# Patient Record
Sex: FEMALE | Race: WHITE | NOT HISPANIC OR LATINO | Employment: FULL TIME | URBAN - METROPOLITAN AREA
[De-identification: names, ages, dates, MRNs, and addresses within clinical notes are randomized per-mention and may not be internally consistent; named-entity substitution may affect disease eponyms.]

---

## 2018-10-07 ENCOUNTER — HOSPITAL ENCOUNTER (EMERGENCY)
Facility: HOSPITAL | Age: 38
Discharge: HOME | End: 2018-10-07
Attending: FAMILY MEDICINE | Admitting: FAMILY MEDICINE

## 2018-10-07 ENCOUNTER — APPOINTMENT (OUTPATIENT)
Dept: GENERAL RADIOLOGY | Facility: HOSPITAL | Age: 38
End: 2018-10-07

## 2018-10-07 VITALS
HEIGHT: 64 IN | BODY MASS INDEX: 19.63 KG/M2 | RESPIRATION RATE: 18 BRPM | WEIGHT: 115 LBS | HEART RATE: 77 BPM | TEMPERATURE: 98.2 F | DIASTOLIC BLOOD PRESSURE: 60 MMHG | OXYGEN SATURATION: 98 % | SYSTOLIC BLOOD PRESSURE: 96 MMHG

## 2018-10-07 DIAGNOSIS — R50.9 FEVER AND CHILLS: Primary | ICD-10-CM

## 2018-10-07 LAB
ALBUMIN SERPL-MCNC: 4.2 G/DL (ref 3.5–5.2)
ALBUMIN/GLOB SERPL: 1.6 G/DL
ALP SERPL-CCNC: 49 U/L (ref 39–117)
ALT SERPL W P-5'-P-CCNC: 7 U/L (ref 1–33)
ANION GAP SERPL CALCULATED.3IONS-SCNC: 13.2 MMOL/L
AST SERPL-CCNC: 11 U/L (ref 1–32)
B PERT DNA SPEC QL NAA+PROBE: NOT DETECTED
BASOPHILS # BLD AUTO: 0.04 10*3/MM3 (ref 0–0.2)
BASOPHILS NFR BLD AUTO: 0.3 % (ref 0–1.5)
BILIRUB SERPL-MCNC: 0.5 MG/DL (ref 0.1–1.2)
BUN BLD-MCNC: 9 MG/DL (ref 6–20)
BUN/CREAT SERPL: 12.2 (ref 7–25)
C PNEUM DNA NPH QL NAA+NON-PROBE: NOT DETECTED
CALCIUM SPEC-SCNC: 8.9 MG/DL (ref 8.6–10.5)
CHLORIDE SERPL-SCNC: 104 MMOL/L (ref 98–107)
CO2 SERPL-SCNC: 21.8 MMOL/L (ref 22–29)
CREAT BLD-MCNC: 0.74 MG/DL (ref 0.57–1)
D-LACTATE SERPL-SCNC: 0.8 MMOL/L (ref 0.5–2)
DEPRECATED RDW RBC AUTO: 45.9 FL (ref 37–54)
EOSINOPHIL # BLD AUTO: 0.12 10*3/MM3 (ref 0–0.7)
EOSINOPHIL NFR BLD AUTO: 0.9 % (ref 0.3–6.2)
ERYTHROCYTE [DISTWIDTH] IN BLOOD BY AUTOMATED COUNT: 13.1 % (ref 11.7–13)
FLUAV H1 2009 PAND RNA NPH QL NAA+PROBE: NOT DETECTED
FLUAV H1 HA GENE NPH QL NAA+PROBE: NOT DETECTED
FLUAV H3 RNA NPH QL NAA+PROBE: NOT DETECTED
FLUAV SUBTYP SPEC NAA+PROBE: NOT DETECTED
FLUBV RNA ISLT QL NAA+PROBE: NOT DETECTED
GFR SERPL CREATININE-BSD FRML MDRD: 88 ML/MIN/1.73
GLOBULIN UR ELPH-MCNC: 2.6 GM/DL
GLUCOSE BLD-MCNC: 131 MG/DL (ref 65–99)
HADV DNA SPEC NAA+PROBE: NOT DETECTED
HCOV 229E RNA SPEC QL NAA+PROBE: NOT DETECTED
HCOV HKU1 RNA SPEC QL NAA+PROBE: NOT DETECTED
HCOV NL63 RNA SPEC QL NAA+PROBE: NOT DETECTED
HCOV OC43 RNA SPEC QL NAA+PROBE: NOT DETECTED
HCT VFR BLD AUTO: 37.7 % (ref 35.6–45.5)
HETEROPH AB SER QL LA: NEGATIVE
HGB BLD-MCNC: 12.4 G/DL (ref 11.9–15.5)
HMPV RNA NPH QL NAA+NON-PROBE: NOT DETECTED
HPIV1 RNA SPEC QL NAA+PROBE: NOT DETECTED
HPIV2 RNA SPEC QL NAA+PROBE: NOT DETECTED
HPIV3 RNA NPH QL NAA+PROBE: NOT DETECTED
HPIV4 P GENE NPH QL NAA+PROBE: NOT DETECTED
IMM GRANULOCYTES # BLD: 0.03 10*3/MM3 (ref 0–0.03)
IMM GRANULOCYTES NFR BLD: 0.2 % (ref 0–0.5)
LYMPHOCYTES # BLD AUTO: 1.52 10*3/MM3 (ref 0.9–4.8)
LYMPHOCYTES NFR BLD AUTO: 10.8 % (ref 19.6–45.3)
M PNEUMO IGG SER IA-ACNC: NOT DETECTED
MCH RBC QN AUTO: 31.9 PG (ref 26.9–32)
MCHC RBC AUTO-ENTMCNC: 32.9 G/DL (ref 32.4–36.3)
MCV RBC AUTO: 96.9 FL (ref 80.5–98.2)
MONOCYTES # BLD AUTO: 0.8 10*3/MM3 (ref 0.2–1.2)
MONOCYTES NFR BLD AUTO: 5.7 % (ref 5–12)
NEUTROPHILS # BLD AUTO: 11.5 10*3/MM3 (ref 1.9–8.1)
NEUTROPHILS NFR BLD AUTO: 82.1 % (ref 42.7–76)
PLATELET # BLD AUTO: 187 10*3/MM3 (ref 140–500)
PMV BLD AUTO: 11.1 FL (ref 6–12)
POTASSIUM BLD-SCNC: 3.4 MMOL/L (ref 3.5–5.2)
PROT SERPL-MCNC: 6.8 G/DL (ref 6–8.5)
RBC # BLD AUTO: 3.89 10*6/MM3 (ref 3.9–5.2)
RHINOVIRUS RNA SPEC NAA+PROBE: NOT DETECTED
RSV RNA NPH QL NAA+NON-PROBE: NOT DETECTED
S PYO AG THROAT QL: NEGATIVE
SODIUM BLD-SCNC: 139 MMOL/L (ref 136–145)
WBC NRBC COR # BLD: 14.01 10*3/MM3 (ref 4.5–10.7)

## 2018-10-07 PROCEDURE — 71046 X-RAY EXAM CHEST 2 VIEWS: CPT

## 2018-10-07 PROCEDURE — 87633 RESP VIRUS 12-25 TARGETS: CPT | Performed by: FAMILY MEDICINE

## 2018-10-07 PROCEDURE — 87581 M.PNEUMON DNA AMP PROBE: CPT | Performed by: FAMILY MEDICINE

## 2018-10-07 PROCEDURE — 87081 CULTURE SCREEN ONLY: CPT | Performed by: FAMILY MEDICINE

## 2018-10-07 PROCEDURE — 86308 HETEROPHILE ANTIBODY SCREEN: CPT | Performed by: FAMILY MEDICINE

## 2018-10-07 PROCEDURE — 87040 BLOOD CULTURE FOR BACTERIA: CPT | Performed by: FAMILY MEDICINE

## 2018-10-07 PROCEDURE — 85025 COMPLETE CBC W/AUTO DIFF WBC: CPT | Performed by: FAMILY MEDICINE

## 2018-10-07 PROCEDURE — 87798 DETECT AGENT NOS DNA AMP: CPT | Performed by: FAMILY MEDICINE

## 2018-10-07 PROCEDURE — 87880 STREP A ASSAY W/OPTIC: CPT | Performed by: FAMILY MEDICINE

## 2018-10-07 PROCEDURE — 83605 ASSAY OF LACTIC ACID: CPT | Performed by: FAMILY MEDICINE

## 2018-10-07 PROCEDURE — 80053 COMPREHEN METABOLIC PANEL: CPT | Performed by: FAMILY MEDICINE

## 2018-10-07 PROCEDURE — 99284 EMERGENCY DEPT VISIT MOD MDM: CPT

## 2018-10-07 PROCEDURE — 87486 CHLMYD PNEUM DNA AMP PROBE: CPT | Performed by: FAMILY MEDICINE

## 2018-10-07 RX ORDER — IBUPROFEN 200 MG
600 TABLET ORAL ONCE
Status: COMPLETED | OUTPATIENT
Start: 2018-10-07 | End: 2018-10-07

## 2018-10-07 RX ORDER — HYDROCODONE BITARTRATE AND ACETAMINOPHEN 5; 325 MG/1; MG/1
1 TABLET ORAL EVERY 4 HOURS PRN
Qty: 6 TABLET | Refills: 0 | Status: SHIPPED | OUTPATIENT
Start: 2018-10-07 | End: 2021-03-16

## 2018-10-07 RX ADMIN — IBUPROFEN 600 MG: 200 TABLET, FILM COATED ORAL at 16:42

## 2018-10-07 NOTE — ED PROVIDER NOTES
EMERGENCY DEPARTMENT ENCOUNTER    CHIEF COMPLAINT  Chief Complaint: sore throat  History given by: Patient  History limited by: N/A  Room Number: 38/38  PMD: Provider, No Known      HPI:  Pt is a 38 y.o. female who presents complaining of sore throat which began yesterday. Pt also c/o fever which began last night, chills, bodyaches, HA, N/V x1 this morning, and productive cough, but denies diarrhea, dysuria, urinary frequency, or abd pain.  Pt reports she had congestion 2 weeks ago, but this had resolved and she felt her baseline self until yesterday. Pt reports her last dose of tylenol was 0600 this morning.    Duration:  1 day  Onset: gradual  Timing: constant  Location: throat  Radiation: none  Quality: sore  Intensity/Severity: moderate  Progression: unchanged  Associated Symptoms: fever which began last night, chills, bodyaches, HA, N/V x1 this morning, and productive cough  Aggravating Factors: none stated  Alleviating Factors: none stated  Previous Episodes: none  Treatment before arrival: Pt reports her last dose of tylenol was 0600 this morning.    PAST MEDICAL HISTORY  Active Ambulatory Problems     Diagnosis Date Noted   • No Active Ambulatory Problems     Resolved Ambulatory Problems     Diagnosis Date Noted   • No Resolved Ambulatory Problems     No Additional Past Medical History       PAST SURGICAL HISTORY  History reviewed. No pertinent surgical history.    FAMILY HISTORY  History reviewed. No pertinent family history.    SOCIAL HISTORY  Social History     Social History   • Marital status:      Spouse name: N/A   • Number of children: N/A   • Years of education: N/A     Occupational History   • Not on file.     Social History Main Topics   • Smoking status: Current Every Day Smoker     Packs/day: 0.50   • Smokeless tobacco: Not on file   • Alcohol use Yes      Comment: ocassional   • Drug use: No   • Sexual activity: Not on file     Other Topics Concern   • Not on file     Social History  Narrative   • No narrative on file       ALLERGIES  Oxycodone and Phenergan [promethazine hcl]    REVIEW OF SYSTEMS  Review of Systems   Constitutional: Positive for chills and fever.   HENT: Positive for sore throat. Negative for congestion.    Eyes: Negative.    Respiratory: Positive for cough (productive). Negative for shortness of breath.    Cardiovascular: Negative for chest pain.   Gastrointestinal: Positive for nausea and vomiting (once this morning). Negative for abdominal pain and diarrhea.   Genitourinary: Negative for dysuria and frequency.   Musculoskeletal: Positive for myalgias (generalized bodyaches). Negative for neck pain.   Skin: Negative for rash.   Allergic/Immunologic: Negative.    Neurological: Positive for headaches. Negative for weakness and numbness.   Hematological: Negative.    Psychiatric/Behavioral: Negative.    All other systems reviewed and are negative.      PHYSICAL EXAM  ED Triage Vitals   Temp Heart Rate Resp BP SpO2   10/07/18 1528 10/07/18 1528 10/07/18 1543 10/07/18 1544 10/07/18 1528   (!) 100.7 °F (38.2 °C) 93 18 99/62 97 %      Temp src Heart Rate Source Patient Position BP Location FiO2 (%)   10/07/18 1528 10/07/18 1528 10/07/18 1544 10/07/18 1544 --   Tympanic Monitor Sitting Right arm        Physical Exam   Constitutional: She is oriented to person, place, and time. No distress.   HENT:   Head: Normocephalic and atraumatic.   Mouth/Throat: Oropharynx is clear and moist.   Eyes: Pupils are equal, round, and reactive to light. EOM are normal.   Neck: Normal range of motion. Neck supple.   Cardiovascular: Normal rate, regular rhythm and normal heart sounds.    Pulmonary/Chest: Effort normal and breath sounds normal. No respiratory distress.   Abdominal: Soft. There is no tenderness. There is no rebound and no guarding.   Musculoskeletal: Normal range of motion. She exhibits no edema.   Neurological: She is alert and oriented to person, place, and time. She has normal  sensation and normal strength.   Skin: Skin is warm and dry. No rash noted.   Psychiatric: Mood and affect normal.   Nursing note and vitals reviewed.      LAB RESULTS  Lab Results (last 24 hours)     Procedure Component Value Units Date/Time    Respiratory Panel, PCR - Swab, Nasopharynx [[id number]]  (Normal) Collected:  10/07/18 1551    Specimen:  Swab from Nasopharynx Updated:  10/07/18 1710     ADENOVIRUS, PCR Not Detected     Coronavirus 229E Not Detected     Coronavirus HKU1 Not Detected     Coronavirus NL63 Not Detected     Coronavirus OC43 Not Detected     Human Metapneumovirus Not Detected     Human Rhinovirus/Enterovirus Not Detected     Influenza B PCR Not Detected     Parainfluenza Virus 1 Not Detected     Parainfluenza Virus 2 Not Detected     Parainfluenza Virus 3 Not Detected     Parainfluenza Virus 4 Not Detected     Bordetella pertussis pcr Not Detected     Influenza A H1 2009 PCR Not Detected     Chlamydophila pneumoniae PCR Not Detected     Mycoplasma pneumo by PCR Not Detected     Influenza A PCR Not Detected     Influenza A H3 Not Detected     Influenza A H1 Not Detected     RSV, PCR Not Detected    CBC & Differential [[id number]] Collected:  10/07/18 1757    Specimen:  Blood Updated:  10/07/18 1832    Narrative:       The following orders were created for panel order CBC & Differential.  Procedure                               Abnormality         Status                     ---------                               -----------         ------                     CBC Auto Differential[[id number]]        Abnormal            Final result                 Please view results for these tests on the individual orders.    Comprehensive Metabolic Panel [[id number]]  (Abnormal) Collected:  10/07/18 1757    Specimen:  Blood Updated:  10/07/18 1851     Glucose 131 (H) mg/dL      BUN 9 mg/dL      Creatinine 0.74 mg/dL      Sodium 139 mmol/L      Potassium 3.4 (L) mmol/L      Chloride 104 mmol/L      CO2 21.8 (L)  mmol/L      Calcium 8.9 mg/dL      Total Protein 6.8 g/dL      Albumin 4.20 g/dL      ALT (SGPT) 7 U/L      AST (SGOT) 11 U/L      Alkaline Phosphatase 49 U/L      Total Bilirubin 0.5 mg/dL      eGFR Non African Amer 88 mL/min/1.73      Globulin 2.6 gm/dL      A/G Ratio 1.6 g/dL      BUN/Creatinine Ratio 12.2     Anion Gap 13.2 mmol/L     CBC Auto Differential [[id number]]  (Abnormal) Collected:  10/07/18 1757    Specimen:  Blood Updated:  10/07/18 1832     WBC 14.01 (H) 10*3/mm3      RBC 3.89 (L) 10*6/mm3      Hemoglobin 12.4 g/dL      Hematocrit 37.7 %      MCV 96.9 fL      MCH 31.9 pg      MCHC 32.9 g/dL      RDW 13.1 (H) %      RDW-SD 45.9 fl      MPV 11.1 fL      Platelets 187 10*3/mm3      Neutrophil % 82.1 (H) %      Lymphocyte % 10.8 (L) %      Monocyte % 5.7 %      Eosinophil % 0.9 %      Basophil % 0.3 %      Immature Grans % 0.2 %      Neutrophils, Absolute 11.50 (H) 10*3/mm3      Lymphocytes, Absolute 1.52 10*3/mm3      Monocytes, Absolute 0.80 10*3/mm3      Eosinophils, Absolute 0.12 10*3/mm3      Basophils, Absolute 0.04 10*3/mm3      Immature Grans, Absolute 0.03 10*3/mm3     Lactic Acid, Plasma [[id number]]  (Normal) Collected:  10/07/18 1810    Specimen:  Blood Updated:  10/07/18 1846     Lactate 0.8 mmol/L     Blood Culture - Blood, Blood, Venous Line [[id number]] Collected:  10/07/18 1935    Specimen:  Blood from Arm, Right Updated:  10/07/18 2010    Blood Culture - Blood, Blood, Venous Line [[id number]] Collected:  10/07/18 2000    Specimen:  Blood from Arm, Left Updated:  10/07/18 2010    Mononucleosis Screen [[id number]] Collected:  10/07/18 2000    Specimen:  Blood Updated:  10/07/18 2012    Rapid Strep A Screen - Swab, Throat [[id number]]  (Normal) Collected:  10/07/18 2001    Specimen:  Swab from Throat Updated:  10/07/18 2024     Strep A Ag Negative    Beta Strep Culture, Throat - Swab, Throat [[id number]] Collected:  10/07/18 2001    Specimen:  Swab from Throat Updated:  10/07/18 2024          I  ordered the above labs and reviewed the results    RADIOLOGY  XR Chest 2 View   Final Result       CXR shows NAD.    I ordered the above noted radiological studies. Interpreted by radiologist. Reviewed by me in PACS.       PROCEDURES  Procedures      PROGRESS AND CONSULTS     1550  Ordered Respiratory Panel  PCR swab.    1629  Ordered ibuprofen for pt's fever.    1749  Respiratory Panel PCR negative. Ordered CXR, CMP, CBC for further evaluation.    1936  HR 88, /62, O2 sat 98% on room air  Rechecked pt, who is resting comfortably. Pt states she has been able to drink water in the ER, feels better after her fever broke with meds she received in the ER. Pt states her primary complaint is her sore throat.  Pt's throat exam was unremarkable, but will add on strep swab and mono screen for further evaluation.  I suspect her elevated WBC may be due to vomiting, will send off blood cultures for further evaluation and stressed importance for pt to return with new or worsening sx. Pt understands and agrees with the plan, all questions answered.    1941  Ordered rapid strep screen and mono screen and blood culture.    2034  Rechecked pt, who is resting comfortably.  Pt's strep swab resulted negative.  Plan for discharge, pt will be notified if her mono screen results positive. Discussed plan for discharge with norco as needed for pain, advised outpatient f/u to PCP and reasons to return to ER. Pt understands and agrees with the plan, all questions answered.      MEDICAL DECISION MAKING  Results were reviewed/discussed with the patient and they were also made aware of online access. Pt also made aware that some labs, such as cultures, will not be resulted during ER visit and follow up with PMD is necessary.     MDM  Number of Diagnoses or Management Options  Fever and chills:      Amount and/or Complexity of Data Reviewed  Clinical lab tests: ordered and reviewed (Respiratory Panel PCR negative.  CBC: WBC 14.01.  Lactic  Acid 0.8.  Strep Swab negative.)  Tests in the radiology section of CPT®: ordered and reviewed (CXR shows NAD.)  Independent visualization of images, tracings, or specimens: yes    Patient Progress  Patient progress: stable         DIAGNOSIS  Final diagnoses:   Fever and chills       DISPOSITION  DISCHARGE    Patient discharged in stable condition.    Reviewed implications of results, diagnosis, meds, responsibility to follow up, warning signs and symptoms of possible worsening, potential complications and reasons to return to ER.    Patient/Family voiced understanding of above instructions.    Discussed plan for discharge, as there is no emergent indication for admission. Patient referred to primary care provider for BP management due to today's BP. Pt/family is agreeable and understands need for follow up and repeat testing.  Pt is aware that discharge does not mean that nothing is wrong but it indicates no emergency is present that requires admission and they must continue care with follow-up as given below or physician of their choice.     FOLLOW-UP  PATIENT LIAISON [facility] [id number]  [phone]  Call   As needed.  If you don't have a PMD you can try to get one of ours.         Medication List      New Prescriptions    HYDROcodone-acetaminophen 5-325 MG per tablet  Commonly known as:  NORCO  Take 1 tablet by mouth Every 4 (Four) Hours As Needed (pain).              Latest Documented Vital Signs:  As of 8:40 PM  BP- 106/62 HR- 84 Temp- 98.2 °F (36.8 °C) (Tympanic) O2 sat- 95%    --  Documentation assistance provided by rosina Mane for Dr. Fried.  Information recorded by the rosina was done at my direction and has been verified and validated by me.                 Anabella Mane  10/07/18 2040       Mina Fried MD  10/07/18 0292

## 2018-10-07 NOTE — ED NOTES
Pt reports she has been taking dayquil and nyquil for her symptoms.      Bria Rowan, RN  10/07/18 8344

## 2018-10-08 NOTE — DISCHARGE INSTRUCTIONS
We will expect you to improve quickly over 24-48 hours.  If not or if you worsen or develop new symptoms, return or see your PMD promptly

## 2018-10-09 LAB — BACTERIA SPEC AEROBE CULT: NORMAL

## 2018-10-12 LAB
BACTERIA SPEC AEROBE CULT: NORMAL
BACTERIA SPEC AEROBE CULT: NORMAL

## 2021-03-16 ENCOUNTER — OFFICE VISIT (OUTPATIENT)
Dept: OBSTETRICS AND GYNECOLOGY | Facility: CLINIC | Age: 41
End: 2021-03-16

## 2021-03-16 VITALS
SYSTOLIC BLOOD PRESSURE: 109 MMHG | DIASTOLIC BLOOD PRESSURE: 72 MMHG | HEIGHT: 65 IN | WEIGHT: 129 LBS | BODY MASS INDEX: 21.49 KG/M2

## 2021-03-16 DIAGNOSIS — Z12.4 CERVICAL CANCER SCREENING: ICD-10-CM

## 2021-03-16 DIAGNOSIS — N63.10 BREAST MASS, RIGHT: ICD-10-CM

## 2021-03-16 DIAGNOSIS — Z01.419 WELL WOMAN EXAM: Primary | ICD-10-CM

## 2021-03-16 DIAGNOSIS — Z12.31 BREAST CANCER SCREENING BY MAMMOGRAM: ICD-10-CM

## 2021-03-16 PROCEDURE — 99386 PREV VISIT NEW AGE 40-64: CPT | Performed by: STUDENT IN AN ORGANIZED HEALTH CARE EDUCATION/TRAINING PROGRAM

## 2021-03-16 NOTE — PROGRESS NOTES
"GYN Annual Exam     CC- Here for annual exam and right breast lump..    Oliva Flores is a 41 y.o. female who presents for annual well woman exam and right breast lump. Periods are regular except most recently in  and Feb where she had 2 periods in 1 month. She reports that she had a period on 1/3/21, 21, early 2021 and late 2021.  Periods normally last 5 days. Dysmenorrhea:severe, occurring first 1-2 days of flow. Cyclic symptoms include none. No intermenstrual bleeding, spotting, or discharge.    She is concerned regarding a right breast lump that is about the size of a marble, non-tender and present for the last month. Denies nipple discharge or skin changes.     OB History        4    Para   4    Term                AB        Living           SAB        TAB        Ectopic        Molar        Multiple        Live Births                    Current contraception: vasectomy  History of abnormal Pap smear: yes - had a LEEP in  and had all normal pap smear following LEEP  Family history of uterine, colon or ovarian cancer: no  History of abnormal mammogram: n/a  Family history of breast cancer: no  Last Pap : - Normal per patient     Past Medical History:   Diagnosis Date   • Abnormal Pap smear of cervix        Past Surgical History:   Procedure Laterality Date   • CERVICAL BIOPSY  W/ LOOP ELECTRODE EXCISION         No current outpatient medications on file.    Allergies   Allergen Reactions   • Oxycodone Itching   • Phenergan [Promethazine Hcl] GI Intolerance       Social History     Tobacco Use   • Smoking status: Current Every Day Smoker     Packs/day: 0.50   Substance Use Topics   • Alcohol use: Yes     Comment: ocassional   • Drug use: No       History reviewed. No pertinent family history.    Review of Systems   Genitourinary: Positive for breast lump and menstrual problem.   All other systems reviewed and are negative.      /72   Ht 165.1 cm (65\")   Wt 58.5 kg " (129 lb)   LMP 02/27/2021   BMI 21.47 kg/m²     Physical Exam  Vitals reviewed. Exam conducted with a chaperone present.   Constitutional:       Appearance: She is normal weight.   HENT:      Head: Normocephalic and atraumatic.      Right Ear: External ear normal.      Left Ear: External ear normal.   Eyes:      Extraocular Movements: Extraocular movements intact.      Pupils: Pupils are equal, round, and reactive to light.   Cardiovascular:      Rate and Rhythm: Normal rate and regular rhythm.   Pulmonary:      Effort: Pulmonary effort is normal. No respiratory distress.   Chest:      Breasts: Breasts are symmetrical.         Right: Mass present. No swelling, bleeding, inverted nipple, nipple discharge, skin change or tenderness.         Left: Normal. No swelling, bleeding, inverted nipple, mass, nipple discharge, skin change or tenderness.          Comments: Right breast mass measuring approximately 3 cm with ovoid shape, mobile, non-tender. It is present at 11 o'clock just superior to the nipple.   Abdominal:      General: There is no distension.      Palpations: Abdomen is soft. There is no mass.      Tenderness: There is no abdominal tenderness. There is no guarding or rebound.      Hernia: No hernia is present.   Musculoskeletal:         General: No deformity. Normal range of motion.      Cervical back: Normal range of motion and neck supple.   Skin:     General: Skin is warm and dry.   Neurological:      General: No focal deficit present.      Mental Status: She is alert and oriented to person, place, and time.   Psychiatric:         Mood and Affect: Mood normal.         Behavior: Behavior normal.       Assessment     1) GYN annual well woman exam.   2) Right breast mass  3) Breast cancer screening  4) Cervical cancer screening      Plan     1) Breast Health - Clinical breast exam yearly, Self breast awareness monthly. Diagnostic bilateral mammogram with right breast ultrasound ordered to evaluate right  breast mass and perform breast cancer screening. We discussed that the next steps for her breast mass is diagnostic imaging followed by possible biopsy if recommended. It appears benign on exam but will base further follow up on imaging studies.    2) Pap - Pap smear with cotesting collected today. If returns normal cytology and negative HPV, recommend repeat screening in 5 years per ASCCP guidelines.   3) Smoking status- Current every day smoker. Encouraged cessation.   4) Activity recommends - Adult 150-300 min/week of multi-component physical activities that include balance training, aerobic and physical strengthening.    5) Follow up prn and one year.       Fina Suh MD

## 2021-03-17 PROBLEM — N63.10 BREAST MASS, RIGHT: Status: ACTIVE | Noted: 2021-03-17

## 2021-03-18 LAB
CYTOLOGIST CVX/VAG CYTO: NORMAL
CYTOLOGY CVX/VAG DOC CYTO: NORMAL
CYTOLOGY CVX/VAG DOC THIN PREP: NORMAL
DX ICD CODE: NORMAL
HIV 1 & 2 AB SER-IMP: NORMAL
HPV I/H RISK 4 DNA CVX QL PROBE+SIG AMP: NEGATIVE
OTHER STN SPEC: NORMAL
STAT OF ADQ CVX/VAG CYTO-IMP: NORMAL

## 2021-04-06 ENCOUNTER — BULK ORDERING (OUTPATIENT)
Dept: CASE MANAGEMENT | Facility: OTHER | Age: 41
End: 2021-04-06

## 2021-04-06 DIAGNOSIS — Z23 IMMUNIZATION DUE: ICD-10-CM

## 2021-04-12 ENCOUNTER — APPOINTMENT (OUTPATIENT)
Dept: WOMENS IMAGING | Facility: HOSPITAL | Age: 41
End: 2021-04-12

## 2021-04-12 PROCEDURE — 77062 BREAST TOMOSYNTHESIS BI: CPT | Performed by: RADIOLOGY

## 2021-04-12 PROCEDURE — 77066 DX MAMMO INCL CAD BI: CPT | Performed by: RADIOLOGY

## 2021-04-12 PROCEDURE — G0279 TOMOSYNTHESIS, MAMMO: HCPCS | Performed by: RADIOLOGY

## 2021-04-12 PROCEDURE — 76641 ULTRASOUND BREAST COMPLETE: CPT | Performed by: RADIOLOGY

## 2022-11-16 ENCOUNTER — TELEPHONE (OUTPATIENT)
Dept: OBSTETRICS AND GYNECOLOGY | Facility: CLINIC | Age: 42
End: 2022-11-16

## 2023-04-26 ENCOUNTER — APPOINTMENT (OUTPATIENT)
Dept: CT IMAGING | Facility: HOSPITAL | Age: 43
DRG: 27 | End: 2023-04-26

## 2023-04-26 ENCOUNTER — APPOINTMENT (OUTPATIENT)
Dept: MRI IMAGING | Facility: HOSPITAL | Age: 43
DRG: 27 | End: 2023-04-26

## 2023-04-26 ENCOUNTER — HOSPITAL ENCOUNTER (INPATIENT)
Facility: HOSPITAL | Age: 43
LOS: 2 days | Discharge: HOME | DRG: 27 | End: 2023-04-29
Attending: EMERGENCY MEDICINE | Admitting: INTERNAL MEDICINE

## 2023-04-26 DIAGNOSIS — H53.8 BLURRED VISION: ICD-10-CM

## 2023-04-26 DIAGNOSIS — H53.9 VISUAL DISTURBANCE: Primary | ICD-10-CM

## 2023-04-26 LAB
ALBUMIN SERPL-MCNC: 4.5 G/DL (ref 3.5–5.2)
ALBUMIN/GLOB SERPL: 2 G/DL
ALP SERPL-CCNC: 37 U/L (ref 39–117)
ALT SERPL W P-5'-P-CCNC: 7 U/L (ref 1–33)
ANION GAP SERPL CALCULATED.3IONS-SCNC: 9.1 MMOL/L (ref 5–15)
AST SERPL-CCNC: 12 U/L (ref 1–32)
B PARAPERT DNA SPEC QL NAA+PROBE: NOT DETECTED
B PERT DNA SPEC QL NAA+PROBE: NOT DETECTED
BASOPHILS # BLD AUTO: 0.08 10*3/MM3 (ref 0–0.2)
BASOPHILS NFR BLD AUTO: 1.1 % (ref 0–1.5)
BILIRUB SERPL-MCNC: <0.2 MG/DL (ref 0–1.2)
BILIRUB UR QL STRIP: NEGATIVE
BUN SERPL-MCNC: 14 MG/DL (ref 6–20)
BUN/CREAT SERPL: 17.3 (ref 7–25)
C PNEUM DNA NPH QL NAA+NON-PROBE: NOT DETECTED
CALCIUM SPEC-SCNC: 9.5 MG/DL (ref 8.6–10.5)
CHLORIDE SERPL-SCNC: 106 MMOL/L (ref 98–107)
CLARITY UR: CLEAR
CO2 SERPL-SCNC: 22.9 MMOL/L (ref 22–29)
COLOR UR: YELLOW
CREAT SERPL-MCNC: 0.81 MG/DL (ref 0.57–1)
DEPRECATED RDW RBC AUTO: 41.2 FL (ref 37–54)
EGFRCR SERPLBLD CKD-EPI 2021: 93.1 ML/MIN/1.73
EOSINOPHIL # BLD AUTO: 0.16 10*3/MM3 (ref 0–0.4)
EOSINOPHIL NFR BLD AUTO: 2.3 % (ref 0.3–6.2)
ERYTHROCYTE [DISTWIDTH] IN BLOOD BY AUTOMATED COUNT: 12.1 % (ref 12.3–15.4)
ERYTHROCYTE [SEDIMENTATION RATE] IN BLOOD: 7 MM/HR (ref 0–20)
FLUAV SUBTYP SPEC NAA+PROBE: NOT DETECTED
FLUBV RNA ISLT QL NAA+PROBE: NOT DETECTED
GLOBULIN UR ELPH-MCNC: 2.2 GM/DL
GLUCOSE SERPL-MCNC: 103 MG/DL (ref 65–99)
GLUCOSE UR STRIP-MCNC: NEGATIVE MG/DL
HADV DNA SPEC NAA+PROBE: NOT DETECTED
HCG SERPL QL: NEGATIVE
HCOV 229E RNA SPEC QL NAA+PROBE: NOT DETECTED
HCOV HKU1 RNA SPEC QL NAA+PROBE: NOT DETECTED
HCOV NL63 RNA SPEC QL NAA+PROBE: NOT DETECTED
HCOV OC43 RNA SPEC QL NAA+PROBE: NOT DETECTED
HCT VFR BLD AUTO: 36.7 % (ref 34–46.6)
HGB BLD-MCNC: 12.4 G/DL (ref 12–15.9)
HGB UR QL STRIP.AUTO: NEGATIVE
HMPV RNA NPH QL NAA+NON-PROBE: NOT DETECTED
HPIV1 RNA ISLT QL NAA+PROBE: NOT DETECTED
HPIV2 RNA SPEC QL NAA+PROBE: NOT DETECTED
HPIV3 RNA NPH QL NAA+PROBE: NOT DETECTED
HPIV4 P GENE NPH QL NAA+PROBE: NOT DETECTED
IMM GRANULOCYTES # BLD AUTO: 0.01 10*3/MM3 (ref 0–0.05)
IMM GRANULOCYTES NFR BLD AUTO: 0.1 % (ref 0–0.5)
KETONES UR QL STRIP: NEGATIVE
LEUKOCYTE ESTERASE UR QL STRIP.AUTO: NEGATIVE
LYMPHOCYTES # BLD AUTO: 2.73 10*3/MM3 (ref 0.7–3.1)
LYMPHOCYTES NFR BLD AUTO: 38.9 % (ref 19.6–45.3)
M PNEUMO IGG SER IA-ACNC: NOT DETECTED
MCH RBC QN AUTO: 31.6 PG (ref 26.6–33)
MCHC RBC AUTO-ENTMCNC: 33.8 G/DL (ref 31.5–35.7)
MCV RBC AUTO: 93.6 FL (ref 79–97)
MONOCYTES # BLD AUTO: 0.48 10*3/MM3 (ref 0.1–0.9)
MONOCYTES NFR BLD AUTO: 6.8 % (ref 5–12)
NEUTROPHILS NFR BLD AUTO: 3.56 10*3/MM3 (ref 1.7–7)
NEUTROPHILS NFR BLD AUTO: 50.8 % (ref 42.7–76)
NITRITE UR QL STRIP: NEGATIVE
NRBC BLD AUTO-RTO: 0 /100 WBC (ref 0–0.2)
PH UR STRIP.AUTO: 7 [PH] (ref 5–8)
PLATELET # BLD AUTO: 205 10*3/MM3 (ref 140–450)
PMV BLD AUTO: 10.5 FL (ref 6–12)
POTASSIUM SERPL-SCNC: 4.1 MMOL/L (ref 3.5–5.2)
PROT SERPL-MCNC: 6.7 G/DL (ref 6–8.5)
PROT UR QL STRIP: NEGATIVE
RBC # BLD AUTO: 3.92 10*6/MM3 (ref 3.77–5.28)
RHINOVIRUS RNA SPEC NAA+PROBE: NOT DETECTED
RSV RNA NPH QL NAA+NON-PROBE: NOT DETECTED
SARS-COV-2 RNA NPH QL NAA+NON-PROBE: NOT DETECTED
SODIUM SERPL-SCNC: 138 MMOL/L (ref 136–145)
SP GR UR STRIP: 1.01 (ref 1–1.03)
UROBILINOGEN UR QL STRIP: NORMAL
WBC NRBC COR # BLD: 7.02 10*3/MM3 (ref 3.4–10.8)

## 2023-04-26 PROCEDURE — 70450 CT HEAD/BRAIN W/O DYE: CPT

## 2023-04-26 PROCEDURE — G0378 HOSPITAL OBSERVATION PER HR: HCPCS

## 2023-04-26 PROCEDURE — 85652 RBC SED RATE AUTOMATED: CPT | Performed by: EMERGENCY MEDICINE

## 2023-04-26 PROCEDURE — 81003 URINALYSIS AUTO W/O SCOPE: CPT | Performed by: EMERGENCY MEDICINE

## 2023-04-26 PROCEDURE — 80053 COMPREHEN METABOLIC PANEL: CPT | Performed by: EMERGENCY MEDICINE

## 2023-04-26 PROCEDURE — 84703 CHORIONIC GONADOTROPIN ASSAY: CPT | Performed by: EMERGENCY MEDICINE

## 2023-04-26 PROCEDURE — 85025 COMPLETE CBC W/AUTO DIFF WBC: CPT | Performed by: EMERGENCY MEDICINE

## 2023-04-26 PROCEDURE — 99284 EMERGENCY DEPT VISIT MOD MDM: CPT

## 2023-04-26 PROCEDURE — 70551 MRI BRAIN STEM W/O DYE: CPT

## 2023-04-26 PROCEDURE — 0202U NFCT DS 22 TRGT SARS-COV-2: CPT | Performed by: EMERGENCY MEDICINE

## 2023-04-26 PROCEDURE — 25010000002 DIPHENHYDRAMINE PER 50 MG: Performed by: EMERGENCY MEDICINE

## 2023-04-26 PROCEDURE — 25010000002 METOCLOPRAMIDE PER 10 MG: Performed by: EMERGENCY MEDICINE

## 2023-04-26 RX ORDER — SODIUM CHLORIDE 0.9 % (FLUSH) 0.9 %
10 SYRINGE (ML) INJECTION AS NEEDED
Status: DISCONTINUED | OUTPATIENT
Start: 2023-04-26 | End: 2023-04-29 | Stop reason: HOSPADM

## 2023-04-26 RX ORDER — SODIUM CHLORIDE 0.9 % (FLUSH) 0.9 %
10 SYRINGE (ML) INJECTION EVERY 12 HOURS SCHEDULED
Status: DISCONTINUED | OUTPATIENT
Start: 2023-04-26 | End: 2023-04-29 | Stop reason: HOSPADM

## 2023-04-26 RX ORDER — METOCLOPRAMIDE HYDROCHLORIDE 5 MG/ML
5 INJECTION INTRAMUSCULAR; INTRAVENOUS ONCE
Status: COMPLETED | OUTPATIENT
Start: 2023-04-26 | End: 2023-04-26

## 2023-04-26 RX ORDER — SODIUM CHLORIDE 9 MG/ML
40 INJECTION, SOLUTION INTRAVENOUS AS NEEDED
Status: DISCONTINUED | OUTPATIENT
Start: 2023-04-26 | End: 2023-04-29 | Stop reason: HOSPADM

## 2023-04-26 RX ORDER — DIPHENHYDRAMINE HYDROCHLORIDE 50 MG/ML
25 INJECTION INTRAMUSCULAR; INTRAVENOUS ONCE
Status: COMPLETED | OUTPATIENT
Start: 2023-04-26 | End: 2023-04-26

## 2023-04-26 RX ADMIN — Medication 10 ML: at 21:25

## 2023-04-26 RX ADMIN — METOCLOPRAMIDE 5 MG: 5 INJECTION, SOLUTION INTRAMUSCULAR; INTRAVENOUS at 20:12

## 2023-04-26 RX ADMIN — DIPHENHYDRAMINE HYDROCHLORIDE 25 MG: 50 INJECTION, SOLUTION INTRAMUSCULAR; INTRAVENOUS at 20:14

## 2023-04-26 NOTE — ED NOTES
Pt c/o losing vision in both eyes, headache, and lightheadedness. Pt states she's had a couple episodes over the last two days where she completely lost vision in both eyes. Pt states the episodes only lasted a couple minutes, but she had an episode this evening that lasted 10-15 minutes. Pt states lightheadedness occurs with the episodes. Pt also c/o sensitivity to light now.

## 2023-04-26 NOTE — ED TRIAGE NOTES
Pt arrives ambulatory to triage for left eye vision changes. 3 days ago the pt was making dinner when suddenly both her eyes went blurry. The right eye vision went back to normal but her left eye has stayed blurry vision. Pt reports a pressure next to her right eye

## 2023-04-26 NOTE — ED PROVIDER NOTES
" EMERGENCY DEPARTMENT ENCOUNTER    Room Number:  111/1  Date seen:  4/26/2023  PCP: Corbin Dc III, MD  Historian: Patient  Relevant information provided by sources other than the patient, review of external records, and social determinants of health may be included in the HPI section.      HPI:  Chief Complaint: Visual disturbance  Additional historical features obtained directly from: No one  Context: Oliva Flores is a 43 y.o. female who presents to the ED c/o transient visual disturbance this happened each of the last 3 days patient states that just out of nowhere she will lose her vision in both eyes and it stays like that for \"a few minutes\" and then returns back to normal over the course of a few more minutes.  She has no other symptoms associated with it other than photosensitivity.  There is no headache    This happened again yesterday and a very similar pattern in timeframe and description and went away completely again.  This afternoon it occurred again and started the same way but then the vision returned in her right eye but remained blurry in her left eye.  Today she also has a dull global headache.  No other associated symptoms, no difficulty speaking or ambulating, she still feels like the vision in her left eye is abnormal        Initial impression including social determinants which will impact the assessment certainly concerning for acute neurologic disturbance.  Could be stroke versus TIA versus atypical migraine would be at the top of my list, but is not inclusive and she may require admission to the hospital depending on the finding          PAST MEDICAL HISTORY  Active Ambulatory Problems     Diagnosis Date Noted   • Breast mass, right 03/17/2021     Resolved Ambulatory Problems     Diagnosis Date Noted   • No Resolved Ambulatory Problems     Past Medical History:   Diagnosis Date   • Abnormal Pap smear of cervix          PAST SURGICAL HISTORY  Past Surgical History: "   Procedure Laterality Date   • CERVICAL BIOPSY  W/ LOOP ELECTRODE EXCISION           FAMILY HISTORY  History reviewed. No pertinent family history.      SOCIAL HISTORY  Social History     Socioeconomic History   • Marital status:    Tobacco Use   • Smoking status: Every Day     Packs/day: 0.25     Types: Cigarettes   Vaping Use   • Vaping Use: Every day   Substance and Sexual Activity   • Alcohol use: Yes     Comment: ocassional   • Drug use: No   • Sexual activity: Yes         ALLERGIES  Oxycodone and Phenergan [promethazine hcl]          PHYSICAL EXAM  ED Triage Vitals   Temp Heart Rate Resp BP SpO2   04/26/23 1839 04/26/23 1839 04/26/23 1839 04/26/23 1843 04/26/23 1839   99.4 °F (37.4 °C) 97 18 114/79 98 %      Temp src Heart Rate Source Patient Position BP Location FiO2 (%)   -- -- -- -- --                Physical Exam      GENERAL: no acute distress  HENT: nares patent  EYES: no scleral icterus, PERRL, EOMI, visual fields appear to be intact although vision in the left eye is blurry  CV: regular rhythm, normal rate, distal pulses symmetric and palpable  RESPIRATORY: normal effort  ABDOMEN: soft, nondistended nontender with normal bowel sound  MUSCULOSKELETAL: no deformity  NEURO: alert, moves all extremities, follows commands  PSYCH:  calm, cooperative  SKIN: warm, dry with no rash        LAB RESULTS  Recent Results (from the past 24 hour(s))   Comprehensive Metabolic Panel    Collection Time: 04/26/23  7:22 PM    Specimen: Blood   Result Value Ref Range    Glucose 103 (H) 65 - 99 mg/dL    BUN 14 6 - 20 mg/dL    Creatinine 0.81 0.57 - 1.00 mg/dL    Sodium 138 136 - 145 mmol/L    Potassium 4.1 3.5 - 5.2 mmol/L    Chloride 106 98 - 107 mmol/L    CO2 22.9 22.0 - 29.0 mmol/L    Calcium 9.5 8.6 - 10.5 mg/dL    Total Protein 6.7 6.0 - 8.5 g/dL    Albumin 4.5 3.5 - 5.2 g/dL    ALT (SGPT) 7 1 - 33 U/L    AST (SGOT) 12 1 - 32 U/L    Alkaline Phosphatase 37 (L) 39 - 117 U/L    Total Bilirubin <0.2 0.0 - 1.2 mg/dL     Globulin 2.2 gm/dL    A/G Ratio 2.0 g/dL    BUN/Creatinine Ratio 17.3 7.0 - 25.0    Anion Gap 9.1 5.0 - 15.0 mmol/L    eGFR 93.1 >60.0 mL/min/1.73   hCG, Serum, Qualitative    Collection Time: 04/26/23  7:22 PM    Specimen: Blood   Result Value Ref Range    HCG Qualitative Negative Negative   Sedimentation Rate    Collection Time: 04/26/23  7:22 PM    Specimen: Blood   Result Value Ref Range    Sed Rate 7 0 - 20 mm/hr   CBC Auto Differential    Collection Time: 04/26/23  7:22 PM    Specimen: Blood   Result Value Ref Range    WBC 7.02 3.40 - 10.80 10*3/mm3    RBC 3.92 3.77 - 5.28 10*6/mm3    Hemoglobin 12.4 12.0 - 15.9 g/dL    Hematocrit 36.7 34.0 - 46.6 %    MCV 93.6 79.0 - 97.0 fL    MCH 31.6 26.6 - 33.0 pg    MCHC 33.8 31.5 - 35.7 g/dL    RDW 12.1 (L) 12.3 - 15.4 %    RDW-SD 41.2 37.0 - 54.0 fl    MPV 10.5 6.0 - 12.0 fL    Platelets 205 140 - 450 10*3/mm3    Neutrophil % 50.8 42.7 - 76.0 %    Lymphocyte % 38.9 19.6 - 45.3 %    Monocyte % 6.8 5.0 - 12.0 %    Eosinophil % 2.3 0.3 - 6.2 %    Basophil % 1.1 0.0 - 1.5 %    Immature Grans % 0.1 0.0 - 0.5 %    Neutrophils, Absolute 3.56 1.70 - 7.00 10*3/mm3    Lymphocytes, Absolute 2.73 0.70 - 3.10 10*3/mm3    Monocytes, Absolute 0.48 0.10 - 0.90 10*3/mm3    Eosinophils, Absolute 0.16 0.00 - 0.40 10*3/mm3    Basophils, Absolute 0.08 0.00 - 0.20 10*3/mm3    Immature Grans, Absolute 0.01 0.00 - 0.05 10*3/mm3    nRBC 0.0 0.0 - 0.2 /100 WBC   Urinalysis With Microscopic If Indicated (No Culture) - Urine, Clean Catch    Collection Time: 04/26/23  7:23 PM    Specimen: Urine, Clean Catch   Result Value Ref Range    Color, UA Yellow Yellow, Straw    Appearance, UA Clear Clear    pH, UA 7.0 5.0 - 8.0    Specific Gravity, UA 1.011 1.005 - 1.030    Glucose, UA Negative Negative    Ketones, UA Negative Negative    Bilirubin, UA Negative Negative    Blood, UA Negative Negative    Protein, UA Negative Negative    Leuk Esterase, UA Negative Negative    Nitrite, UA Negative Negative  "   Urobilinogen, UA 0.2 E.U./dL 0.2 - 1.0 E.U./dL   Respiratory Panel PCR w/COVID-19(SARS-CoV-2) [facility]AY[facility] In-House, NP Swab in UTM/VTM, 3-4 HR TAT - Swab, Nasopharynx    Collection Time: 04/26/23  7:56 PM    Specimen: Nasopharynx; Swab   Result Value Ref Range    ADENOVIRUS, PCR Not Detected Not Detected    Coronavirus 229E Not Detected Not Detected    Coronavirus HKU1 Not Detected Not Detected    Coronavirus NL63 Not Detected Not Detected    Coronavirus OC43 Not Detected Not Detected    COVID19 Not Detected Not Detected - Ref. Range    Human Metapneumovirus Not Detected Not Detected    Human Rhinovirus/Enterovirus Not Detected Not Detected    Influenza A PCR Not Detected Not Detected    Influenza B PCR Not Detected Not Detected    Parainfluenza Virus 1 Not Detected Not Detected    Parainfluenza Virus 2 Not Detected Not Detected    Parainfluenza Virus 3 Not Detected Not Detected    Parainfluenza Virus 4 Not Detected Not Detected    RSV, PCR Not Detected Not Detected    Bordetella pertussis pcr Not Detected Not Detected    Bordetella parapertussis PCR Not Detected Not Detected    Chlamydophila pneumoniae PCR Not Detected Not Detected    Mycoplasma pneumo by PCR Not Detected Not Detected       Ordered the above labs and my independent interpretation of these results are discussed in the section labeled \"ED course\" below        RADIOLOGY  CT Head Without Contrast    Result Date: 4/26/2023  CT HEAD WO CONTRAST-  Radiation dose reduction techniques were utilized, including automated exposure control and exposure modulation based on body size.  Clinical: 43-year-old female with blurred vision, lightheaded  CT findings: No intracranial hemorrhage, gross mass effect or edema. There is good differentiation between the wording 3 matter. The ventricles have a satisfactory appearance and the basilar cisterns are preserved. The posterior fossa contents within normal limits. Temporal bones have a symmetric " "satisfactory appearance. Internal auditory canals are normal. No acute osseous abnormality or bone lesion seen. The globes are symmetric and satisfactory in position. No orbital mass. Visualized paranasal sinuses within normal limits.  CONCLUSION: No acute intracranial abnormality.   This report was finalized on 4/26/2023 8:14 PM by Dr. Roberto Marie M.D.        Ordered the above noted radiological studies.  Independently interpreted by me in PACS.  My independent interpretation of these results are discussed in the section below labeled \"ED course\"        PROCEDURES  Procedures          MEDICATIONS GIVEN IN ER  Medications   sodium chloride 0.9 % flush 10 mL (has no administration in time range)   sodium chloride 0.9 % flush 10 mL (10 mL Intravenous Given 4/26/23 2125)   sodium chloride 0.9 % flush 10 mL (has no administration in time range)   sodium chloride 0.9 % infusion 40 mL (has no administration in time range)   metoclopramide (REGLAN) injection 5 mg (5 mg Intravenous Given 4/26/23 2012)   diphenhydrAMINE (BENADRYL) injection 25 mg (25 mg Intravenous Given 4/26/23 2014)                   MEDICAL DECISION MAKING and INDEPENDENT INTERPRETATIONS      Everything documented below this statement is the \"ED course\" section which I have referred to above.  Everything discussed in the following section constitutes MY INDEPENDENT INTERPRETATIONS of the lab work, EKGs, and radiology studies, and all of my personal conversations with other providers, and all of my independent decision making regarding this patient are discussed below.      ED Course as of 04/26/23 2217 Wed Apr 26, 2023 2215 White count and hemoglobin are normal [DP]   2216 Demonstrate shows normal electrolytes [DP]   2216 Urinalysis is negative [DP]   2216 Respiratory viral panel is negative [DP]   2216 hCG is negative and sed rate is 7.  This argues against temporal arteritis as a cause.  Not only that but that would typically be monocular and " "unilateral [DP]   2216 CT scan of the head without contrast shows no acute abnormalities.  I treated the patient with a typical migraine cocktail and when I went back to reevaluate and explained the results, she said that the symptoms had resolved and so has her headache. [DP]   2216 At this point, talk to the patient about the benefit of admission to the observation unit for further neurologic evaluation to rule out stroke and other abnormalities.  I feel like the most likely etiology is atypical migraine, but cannot make a definitive diagnosis without neurology input. [DP]   2217 Spoke with nurse practitioner the observation unit who agrees to admit for this purpose [DP]      ED Course User Index  [DP] Davis Goodman MD         Everything documented above with this statement, in the ED course section constitutes my independent interpretation of lab work EKG and radiology studies along with my personal conversations with other providers and my independent medical decision making.  This statement will not be repeated before each data point, but they are all my independent interpretation needs contained within the \"ED course\" section.             All appropriate hygiene and PPE requirements were satisfied with this patient encounter      FINAL DIAGNOSES  Final diagnoses:   Visual disturbance           DISPOSITION  Admit to observation          Latest Documented Vital Signs:  As of 22:17 EDT  BP- 102/57 HR- 66 Temp- 98.1 °F (36.7 °C) (Oral) O2 sat- 99%        --    Please note that portions of this were completed with a voice recognition program.       Note Disclaimer: At [facility], we believe that sharing information builds trust and better relationships. You are receiving this note because you are receiving care at [facility] or recently visited. It is possible you will see health information before a provider has talked with you about it. This kind of information can be easy to misunderstand. To help you " fully understand what it      Davis Goodman MD  04/26/23 1437

## 2023-04-27 ENCOUNTER — APPOINTMENT (OUTPATIENT)
Dept: CT IMAGING | Facility: HOSPITAL | Age: 43
DRG: 27 | End: 2023-04-27

## 2023-04-27 PROBLEM — I67.1 ANEURYSM OF INTERNAL CAROTID ARTERY: Status: ACTIVE | Noted: 2023-04-27

## 2023-04-27 LAB
CHOLEST SERPL-MCNC: 130 MG/DL (ref 0–200)
GLUCOSE BLDC GLUCOMTR-MCNC: 98 MG/DL (ref 70–130)
HBA1C MFR BLD: 5.2 % (ref 4.8–5.6)
HDLC SERPL-MCNC: 56 MG/DL (ref 40–60)
LDLC SERPL CALC-MCNC: 59 MG/DL (ref 0–100)
LDLC/HDLC SERPL: 1.05 {RATIO}
TRIGL SERPL-MCNC: 76 MG/DL (ref 0–150)
VLDLC SERPL-MCNC: 15 MG/DL (ref 5–40)

## 2023-04-27 PROCEDURE — 80061 LIPID PANEL: CPT | Performed by: NURSE PRACTITIONER

## 2023-04-27 PROCEDURE — 25510000001 IOPAMIDOL PER 1 ML: Performed by: EMERGENCY MEDICINE

## 2023-04-27 PROCEDURE — 82948 REAGENT STRIP/BLOOD GLUCOSE: CPT

## 2023-04-27 PROCEDURE — 70496 CT ANGIOGRAPHY HEAD: CPT

## 2023-04-27 PROCEDURE — 70498 CT ANGIOGRAPHY NECK: CPT

## 2023-04-27 PROCEDURE — 99254 IP/OBS CNSLTJ NEW/EST MOD 60: CPT | Performed by: NURSE PRACTITIONER

## 2023-04-27 PROCEDURE — 83036 HEMOGLOBIN GLYCOSYLATED A1C: CPT | Performed by: NURSE PRACTITIONER

## 2023-04-27 RX ORDER — ASPIRIN 325 MG
325 TABLET, DELAYED RELEASE (ENTERIC COATED) ORAL ONCE
Status: COMPLETED | OUTPATIENT
Start: 2023-04-27 | End: 2023-04-27

## 2023-04-27 RX ORDER — ASPIRIN 325 MG
TABLET ORAL
Status: ACTIVE
Start: 2023-04-27 | End: 2023-04-28

## 2023-04-27 RX ORDER — CLOPIDOGREL BISULFATE 75 MG/1
300 TABLET ORAL DAILY
Status: COMPLETED | OUTPATIENT
Start: 2023-04-28 | End: 2023-04-28

## 2023-04-27 RX ADMIN — Medication 10 ML: at 13:05

## 2023-04-27 RX ADMIN — Medication 10 ML: at 21:00

## 2023-04-27 RX ADMIN — ASPIRIN 325 MG: 325 TABLET, COATED ORAL at 16:46

## 2023-04-27 RX ADMIN — IOPAMIDOL 95 ML: 755 INJECTION, SOLUTION INTRAVENOUS at 13:16

## 2023-04-27 NOTE — CASE MANAGEMENT/SOCIAL WORK
Discharge Planning Assessment  [facility]     Patient Name: Oliva Flores  MRN: [id number]  Today's Date: 4/27/2023    Admit Date: 4/26/2023    Plan: Patient plans to return home upon discharge with family to transport. No needs anticipated.   Discharge Needs Assessment     Row Name 04/27/23 1233       Living Environment    People in Home spouse;child(richardson), adult    Current Living Arrangements home    Family Caregiver if Needed child(richardson), adult    Quality of Family Relationships helpful;involved;supportive    Able to Return to Prior Arrangements yes       Resource/Environmental Concerns    Resource/Environmental Concerns none    Transportation Concerns none       Transition Planning    Patient/Family Anticipates Transition to home with family    Patient/Family Anticipated Services at Transition none    Transportation Anticipated family or friend will provide       Discharge Needs Assessment    Readmission Within the Last 30 Days no previous admission in last 30 days    Equipment Currently Used at Home none    Concerns to be Addressed no discharge needs identified;denies needs/concerns at this time    Anticipated Changes Related to Illness none    Equipment Needed After Discharge none               Discharge Plan     Row Name 04/27/23 1233       Plan    Plan Patient plans to return home upon discharge with family to transport. No needs anticipated.    Patient/Family in Agreement with Plan yes    Plan Comments Patient plans to return home upon discharge, where she lives in private residence with her daughter. Daughter can provide discharge transportation. Patient is IND with IADL's, denies any discharge needs. She uses [facility] [address], but would like to be enrolled in Meds to [facility]. No needs anticipated.              Continued Care and Services - Admitted Since 4/26/2023    Coordination has not been started for this encounter.          Demographic Summary     Row Name 04/27/23 123        General Information    Admission Type observation    Arrived From home    Required Notices Provided Observation Status Notice    Expected Length of Stay (LOS) Less than 24 hours. Admitted to ED Observation Unit.    Referral Source admission list;emergency department    Reason for Consult discharge planning    Preferred Language English    General Information Comments Facesheet verified. Role of CM explained. Appropriate PPE worn at all times.               Functional Status     Row Name 04/27/23 1232       Functional Status, IADL    Medications independent    Meal Preparation independent    Housekeeping independent    Laundry independent    Shopping independent       Mental Status    General Appearance WDL WDL       Mental Status Summary    Recent Changes in Mental Status/Cognitive Functioning no changes       Employment/    Employment Status employed full-time               Psychosocial     Row Name 04/27/23 1233       Behavior WDL    Behavior WDL WDL       Emotion Mood WDL    Emotion/Mood/Affect WDL WDL       Speech WDL    Speech WDL WDL       Perceptual State WDL    Perceptual State WDL WDL       Thought Process WDL    Thought Process WDL WDL       Intellectual Performance WDL    Intellectual Performance WDL WDL       Coping/Stress    Sources of Support adult child(richardson)    Techniques to [place] with Loss/Stress/Change not applicable    Reaction to Health Status accepting;adjusting       Developmental Stage (Eriksson's)    Developmental Stage Stage 7 (35-65 years/Middle Adulthood) Generativity vs. Stagnation               Abuse/Neglect    No documentation.                Legal    No documentation.                Substance Abuse    No documentation.                Patient Forms    No documentation.                   Jez Hardy RN

## 2023-04-27 NOTE — H&P
".   [facility]   HISTORY AND PHYSICAL    Patient Name: Oliva Flores  : 1980  MRN: [id number]  Primary Care Physician:  Corbin Dc III, MD  Date of admission: 2023    Subjective   Subjective     Chief Complaint: Blurred vision    HPI:    Oliva Flores is a 43 y.o. female with no significant past medical history presents to [facility] complaining of blurred vision and intermittent vision loss of both eyes for the past 3 days.  Patient reports that she has had intermittent  vision loss of both eyes that last for about2 minutes and resolves spontaneously.  Patient reports that she feels as if she stood up too fast and become lightheaded and dizzy prior to her vision going\" black\".  Today patient had an episode of vision loss that was followed with blurred vision that lasted longer than usual and presented to the ED. currently patient asymptomatic.  Reports associated headache but denies dysarthria, aphasia, or numbness/tingling.  Denies unilateral weakness.  Denies similar episodes in the past.  Denies chest pain, palpitation, or dyspnea.  Denies abdominal pain, nausea, vomiting, diarrhea.    Laboratory evaluation relatively unremarkable.  RPP negative.  CT head without negative for acute intracranial findings.    Review of Systems   All systems were reviewed and negative except for: The mentioned above in HPI    Personal History     Past Medical History:   Diagnosis Date   • Abnormal Pap smear of cervix        Past Surgical History:   Procedure Laterality Date   • CERVICAL BIOPSY  W/ LOOP ELECTRODE EXCISION         Family History: family history is not on file. Otherwise pertinent FHx was reviewed and not pertinent to current issue.    Social History:  reports that she has been smoking. She has been smoking an average of .5 packs per day. She does not have any smokeless tobacco history on file. She reports current alcohol use. She reports that she does not use " drugs.    Home Medications:       Allergies:  Allergies   Allergen Reactions   • Oxycodone Itching   • Phenergan [Promethazine Hcl] GI Intolerance       Objective   Objective     Vitals:   Temp:  [98.1 °F (36.7 °C)-99.4 °F (37.4 °C)] 98.1 °F (36.7 °C)  Heart Rate:  [66-97] 66  Resp:  [16-18] 16  BP: ()/(54-97) 102/57  Physical Exam    Constitutional: Awake, alert   Eyes: PERRLA, sclerae anicteric, no conjunctival injection   HENT: NCAT, mucous membranes moist   Neck: Supple, no thyromegaly, no lymphadenopathy, trachea midline   Respiratory: Clear to auscultation bilaterally, nonlabored respirations    Cardiovascular: RRR, no murmurs, rubs, or gallops, palpable pedal pulses bilaterally   Gastrointestinal: Positive bowel sounds, soft, nontender, nondistended   Musculoskeletal: No bilateral ankle edema, no clubbing or cyanosis to extremities   Psychiatric: Appropriate affect, cooperative   Neurologic: Oriented x 3, strength symmetric in all extremities, Cranial Nerves grossly intact to confrontation, speech clear   Skin: No rashes     Result Review    Result Review:  I have personally reviewed the results from the time of this admission to 4/26/2023 21:09 EDT and agree with these findings:  []  Laboratory list / accordion  []  Microbiology  []  Radiology  []  EKG/Telemetry   []  Cardiology/Vascular   []  Pathology  []  Old records  []  Other:        Assessment & Plan   Assessment / Plan     Brief Patient Summary:  Oliva Flores is a 43 y.o. female who was seen and evaluated the ED for intermittent vision loss and blurred vision.  Patient is being admitted to observation for further evaluation and neurology consult.    Active Hospital Problems:  Active Hospital Problems    Diagnosis    • **Blurred vision      Plan:     Blurred vision  -CT head without negative for acute intracranial findings  -MRI brain without pending  -Neurology consult  -Neurochecks every 4 hours  -A1c and lipid panel  -NIH  0    Headache  -Benadryl and Reglan      DVT prophylaxis:  Mechanical DVT prophylaxis orders are present.    CODE STATUS:    Level Of Support Discussed With: Patient  Code Status (Patient has no pulse and is not breathing): CPR (Attempt to Resuscitate)  Medical Interventions (Patient has pulse or is breathing): Full Support    Admission Status:  I believe this patient meets observation status.    .Total of 17 minutes have been spent on this H&P including face-to-face encounter and reviewing labs and imaging    Electronically signed by JOSE MIGUEL Rodriguez, 04/26/23, 9:09 PM EDT.

## 2023-04-27 NOTE — CONSULTS
Internal Medicine Consult  INTERNAL MEDICINE   [facility]       Patient Identification:  Name: Oliva Flores  Age: 43 y.o.  Sex: female  :  1980  MRN: [id number]                   Primary Care Physician: Corbin Dc III, MD                               Requesting physician: MARCO Duval  Date of consultation: 2023    Reason for consultation: And need for continued inpatient care for further management of left ICA aneurysm.    History of Present Illness:   Source of information review of records, discussion with patient and discussion with MARCO Arroyo..  Patient is a 43-year-old female who presented to our facility on 2023 with symptoms of intermittent vision loss involving both eyes over the course of last 3 days.  Patient has associated orthostatic lightheadedness with these episodes lasting for few seconds the last 1 reportedly lasted for about a minute.  Patient did not have any associated focal weakness of arm or legs speech difficulty facial droop or difficulty in thinking understanding and expressing herself.  Work-up in the emergency room was negative for any acute intracranial process on the CT scan of the head.  Patient was admitted in the observation unit for further work-up including neurology evaluation.  MRI of the brain was negative but CT angiogram of the head and neck showed 8.9 x 7 x 7 mm aneurysm in the left internal carotid artery near its bifurcation.  Neurosurgery service and interventional vascular surgery service was consulted and patient was recommended to have dedicated cerebral angiogram tomorrow necessitating her admission status changed from observation to inpatient.  This resulted in hospitalist service being consulted for continued inpatient care for the management of left internal carotid artery aneurysm.  Patient is obviously distraught and anxious about this finding and has lots of questions.  At present she  "denies any physical symptoms.    Past Medical History:  Past Medical History:   Diagnosis Date   • Abnormal Pap smear of cervix      Past Surgical History:  Past Surgical History:   Procedure Laterality Date   • CERVICAL BIOPSY  W/ LOOP ELECTRODE EXCISION        Home Meds:  No medications prior to admission.     Current Meds:     Current Facility-Administered Medications:   •  aspirin 325 MG tablet  - ADS Override Pull, , , ,   •  [START ON 4/28/2023] clopidogrel (PLAVIX) tablet 300 mg, 300 mg, Oral, Daily, Eloina Carpenter APRN  •  [COMPLETED] Insert Peripheral IV, , , Once **AND** sodium chloride 0.9 % flush 10 mL, 10 mL, Intravenous, PRN, Davis Goodman MD  •  sodium chloride 0.9 % flush 10 mL, 10 mL, Intravenous, Q12H, Qadafacundo, Abdalhacynthiam, APRN, 10 mL at 04/27/23 1305  •  sodium chloride 0.9 % flush 10 mL, 10 mL, Intravenous, PRN, Qadah, Abdalhakim, APRN  •  sodium chloride 0.9 % infusion 40 mL, 40 mL, Intravenous, PRN, Qadah, Abdalhakim, APRN  Allergies:  Allergies   Allergen Reactions   • Oxycodone Itching   • Phenergan [Promethazine Hcl] GI Intolerance     Social History:   Social History     Tobacco Use   • Smoking status: Every Day     Packs/day: 0.25     Types: Cigarettes   • Smokeless tobacco: Not on file   Substance Use Topics   • Alcohol use: Yes     Comment: ocassional      Family History:  History reviewed. No pertinent family history.       Review of Systems  See history of present illness and past medical history.  As described in history presenting illness.  Has associated dizziness and lightheadedness and visual disturbance as described but when she is resting she has no dizziness.    Vitals:   /62 (BP Location: Left arm, Patient Position: Lying)   Pulse 68   Temp 98.6 °F (37 °C) (Oral)   Resp 18   Ht 162.6 cm (64\")   Wt 56.2 kg (124 lb)   LMP 04/12/2023 (Approximate)   SpO2 100%   BMI 21.28 kg/m²   I/O:     Intake/Output Summary (Last 24 hours) at 4/27/2023 1820  Last data filed " at 4/27/2023 0816  Gross per 24 hour   Intake 350 ml   Output --   Net 350 ml     Exam: Physical examination from neurology service, emergency room observation service reviewed as follows:.  Patient is examined using the personal protective equipment as per guidelines from infection control for this particular patient as enacted.  Hand washing was performed before and after patient interaction.  General Appearance:   Alert cooperative anxious female who is having teary eyes.   Head:    Normocephalic, without obvious abnormality, atraumatic   Eyes:    PERRL, conjunctiva/corneas clear, EOM's intact, both eyes   Ears:    Normal external ear canals, both ears   Nose:   Nares normal, septum midline, mucosa normal, no drainage    or sinus tenderness   Throat:   Lips, tongue, gums normal; oral mucosa pink and moist   Neck:   Supple, symmetrical, trachea midline, no adenopathy;     thyroid:  no enlargement/tenderness/nodules; no carotid    bruit or JVD   Back:     Symmetric, no curvature, ROM normal, no CVA tenderness   Lungs:     Clear to auscultation bilaterally, respirations unlabored   Chest Wall:    No tenderness or deformity    Heart:   S1-S2 regular   Abdomen:    Soft nontender   Extremities:   Extremities normal, atraumatic, no cyanosis or edema   Pulses:   Pulses palpable in all extremities; symmetric all extremities   Skin:   Skin color normal, Skin is warm and dry,  no rashes or palpable lesions   Neurologic:  Grossly nonfocal, alert and oriented x3       Data Review:      I reviewed the patient's new clinical results.  Results from last 7 days   Lab Units 04/26/23 1922   WBC 10*3/mm3 7.02   HEMOGLOBIN g/dL 12.4   PLATELETS 10*3/mm3 205     Results from last 7 days   Lab Units 04/26/23 1922   SODIUM mmol/L 138   POTASSIUM mmol/L 4.1   CHLORIDE mmol/L 106   CO2 mmol/L 22.9   BUN mg/dL 14   CREATININE mg/dL 0.81   CALCIUM mg/dL 9.5   GLUCOSE mg/dL 103*     MRI Brain Without Contrast    Result Date:  4/27/2023  Electronically signed by Mimi Ruiz M.D. on 04-27-23 at 0106    CT Angiogram Carotids    Result Date: 4/27/2023  1. The head CT demonstrates no convincing acute intracranial abnormality. 2. Mild cervical spondylosis is present with some disc space narrowing and degenerative endplate changes at C5-6 with posterior spurs contributing to mild canal narrowing and there are uncovertebral joint spurs that contribute to mild-to-moderate left and moderate right bony foraminal narrowing at C5-6. Otherwise, the cervical spine is unremarkable. 3. The CT angiographic evaluation of the great vessels of the neck is normal with no stenosis seen in the great vessels in the neck. In particular, no vertebral artery stenosis is seen and no stenosis is seen in the cervical segments of internal carotid arteries using the NASCET criteria. 4. CT angiographic evaluation of the head demonstrates an aneurysm projected anterior, superior laterally off of the the superior tip of the left internal carotid terminus. The aneurysm maximally measures 9 x 7 x 6 mm in medial lateral and anterior posterior and craniocaudal dimension and has a relatively narrow neck at its attachment to the superior tip of the left internal carotid terminus with the neck measuring 3.5 x 3.5 mm. Endovascular neurosurgical consultation is warranted. 5. The remainder of the CT angiogram of the head and neck is within normal limits and the etiology of the visual changes is not clearly established on this exam, correlate clinically. The results were communicated to Kaitlin Zapata, the physician assistant in the [facility] observation unit taking care of the patient, by telephone on 04/27/2023 at 2:00 PM. I also communicated to Dr. Lepe from Stroke Neurology by telephone on 04/27/2023 at 2:05 PM.  Radiation dose reduction techniques were utilized, including automated exposure control and exposure modulation based on body size.  This report was finalized on  4/27/2023 5:24 PM by Dr. Gautam Torres M.D.      CT Angiogram Head    Result Date: 4/27/2023  1. The head CT demonstrates no convincing acute intracranial abnormality. 2. Mild cervical spondylosis is present with some disc space narrowing and degenerative endplate changes at C5-6 with posterior spurs contributing to mild canal narrowing and there are uncovertebral joint spurs that contribute to mild-to-moderate left and moderate right bony foraminal narrowing at C5-6. Otherwise, the cervical spine is unremarkable. 3. The CT angiographic evaluation of the great vessels of the neck is normal with no stenosis seen in the great vessels in the neck. In particular, no vertebral artery stenosis is seen and no stenosis is seen in the cervical segments of internal carotid arteries using the NASCET criteria. 4. CT angiographic evaluation of the head demonstrates an aneurysm projected anterior, superior laterally off of the the superior tip of the left internal carotid terminus. The aneurysm maximally measures 9 x 7 x 6 mm in medial lateral and anterior posterior and craniocaudal dimension and has a relatively narrow neck at its attachment to the superior tip of the left internal carotid terminus with the neck measuring 3.5 x 3.5 mm. Endovascular neurosurgical consultation is warranted. 5. The remainder of the CT angiogram of the head and neck is within normal limits and the etiology of the visual changes is not clearly established on this exam, correlate clinically. The results were communicated to Kaitlin Zapata, the physician assistant in the [facility] observation unit taking care of the patient, by telephone on 04/27/2023 at 2:00 PM. I also communicated to Dr. Lepe from Stroke Neurology by telephone on 04/27/2023 at 2:05 PM.  Radiation dose reduction techniques were utilized, including automated exposure control and exposure modulation based on body size.  This report was finalized on 4/27/2023 5:24 PM by Dr. Gautam Torres,  M.D.      Microbiology Results (last 10 days)     Procedure Component Value - Date/Time    Respiratory Panel PCR w/COVID-19(SARS-CoV-2) [facility]A/IGOR/DARYL/PAD/[facility] In-House, NP Swab in UTM/VTM, 3-4 HR TAT - Swab, Nasopharynx [[id number]]  (Normal) Collected: 04/26/23 1956    Lab Status: Final result Specimen: Swab from Nasopharynx Updated: 04/26/23 2059     ADENOVIRUS, PCR Not Detected     Coronavirus 229E Not Detected     Coronavirus HKU1 Not Detected     Coronavirus NL63 Not Detected     Coronavirus OC43 Not Detected     COVID19 Not Detected     Human Metapneumovirus Not Detected     Human Rhinovirus/Enterovirus Not Detected     Influenza A PCR Not Detected     Influenza B PCR Not Detected     Parainfluenza Virus 1 Not Detected     Parainfluenza Virus 2 Not Detected     Parainfluenza Virus 3 Not Detected     Parainfluenza Virus 4 Not Detected     RSV, PCR Not Detected     Bordetella pertussis pcr Not Detected     Bordetella parapertussis PCR Not Detected     Chlamydophila pneumoniae PCR Not Detected     Mycoplasma pneumo by PCR Not Detected    Narrative:      In the setting of a positive respiratory panel with a viral infection PLUS a negative procalcitonin without other underlying concern for bacterial infection, consider observing off antibiotics or discontinuation of antibiotics and continue supportive care. If the respiratory panel is positive for atypical bacterial infection (Bordetella pertussis, Chlamydophila pneumoniae, or Mycoplasma pneumoniae), consider antibiotic de-escalation to target atypical bacterial infection.          Assessment:  Active Hospital Problems    Diagnosis  POA   • **Blurred vision [H53.8]  Yes   • Aneurysm of internal carotid artery [I67.1]  Yes   Chronic smoking    Plan:  · Continue with neurology and interventional radiology/neurovascular interventionalists care plan including plans for cerebral angiography and possible intervention in a.m.  Continue with aspirin started tonight  with plans for Plavix in a.m.  · Chronic smoking-continue smoking cessation counseling      Starla Beltran MD   4/27/2023  18:20 EDT    Parts of this note may be an electronic transcription/translation of spoken language to printed text using the Dragon dictation system.

## 2023-04-27 NOTE — PROGRESS NOTES
ED OBSERVATION PROGRESS/DISCHARGE SUMMARY    Date of Admission: 4/26/2023   LOS: 0 days   PCP: Corbin Dc III, MD      Subjective:  Patient reports no further visual disturbance.  She denies any numbness and tingling or weakness.  Denies headache.  Denies chest pain or shortness of breath.  Denies abdominal pain and nausea.     Hospital Outcome:   43-year-old female admitted to the observation unit for further evaluation of visual disturbance.  CT head in the ER showed no acute findings.  MRI of the brain without was normal with no acute infarct, bleed, or acute intracranial abnormality.  Laboratory work-up was fairly unremarkable.  Neurology saw and evaluated the patient recommending further evaluation with CTA of the head and neck.  CTA revealed an 89 x 7 x 7 mm aneurysm in the left ICA terminus.  Dr. Carbajal with neurosurgery plans for cerebral angiography tomorrow.  I discussed all of the findings and plan with the patient who endorses understanding is in agreement.  Spoke with Dr. Beltran with [facility] who agrees to accept the patient under his service.    ROS:  General: no fevers, chills  Respiratory: no cough, dyspnea  Cardiovascular: no chest pain, palpitations  Abdomen: No abdominal pain, nausea, vomiting, or diarrhea  Neurologic: No focal weakness    Objective   Physical Exam:  I have reviewed the vital signs.  Temp:  [98.1 °F (36.7 °C)-99.4 °F (37.4 °C)] 98.6 °F (37 °C)  Heart Rate:  [59-97] 59  Resp:  [16-18] 16  BP: ()/(51-97) 96/51  General Appearance:  43-year-old female, well-nourished, no acute distress on room air  Head:    Normocephalic, atraumatic  Eyes:    Sclerae anicteric  Neck:   Supple, no mass  Lungs: Clear to auscultation bilaterally, respirations unlabored  Heart: Regular rate and rhythm, S1 and S2 normal, no murmur, rub or gallop  Abdomen:  Soft, non-tender, bowel sounds active, nondistended  Extremities: No clubbing, cyanosis, or edema to lower extremities  Pulses:  2+ and  symmetric in distal lower extremities  Skin: No rashes   Neurologic: Oriented x3, Normal strength to extremities    Results Review:    I have reviewed the labs, radiology results and diagnostic studies.    Results from last 7 days   Lab Units 04/26/23 1922   WBC 10*3/mm3 7.02   HEMOGLOBIN g/dL 12.4   HEMATOCRIT % 36.7   PLATELETS 10*3/mm3 205     Results from last 7 days   Lab Units 04/26/23  1922   SODIUM mmol/L 138   POTASSIUM mmol/L 4.1   CHLORIDE mmol/L 106   CO2 mmol/L 22.9   BUN mg/dL 14   CREATININE mg/dL 0.81   CALCIUM mg/dL 9.5   BILIRUBIN mg/dL <0.2   ALK PHOS U/L 37*   ALT (SGPT) U/L 7   AST (SGOT) U/L 12   GLUCOSE mg/dL 103*     Imaging Results (Last 24 Hours)     Procedure Component Value Units Date/Time    CT Angiogram Carotids [[id number]] Resulted: 04/27/23 1316     Updated: 04/27/23 1323    CT Angiogram Head [[id number]] Resulted: 04/27/23 1316     Updated: 04/27/23 1323    MRI Brain Without Contrast [[id number]] Collected: 04/27/23 0107     Updated: 04/27/23 0107    Narrative:        Patient: DOM BALES  Time Out: 01:06  Exam(s): MRI HEAD Without Contrast     EXAM:    MR Head Without Intravenous Contrast    CLINICAL HISTORY:     Reason for exam: Blurred vision.    TECHNIQUE:    Magnetic resonance images of the head brain without intravenous   contrast in multiple planes.  Mild motion artifact.    COMPARISON:     Noncontrast head CT done earlier.    FINDINGS:    Brain: No edema or acute infarct. No acute or chronic hemorrhage.  No   abnormal signal in the brain parenchyma, with attention to the visual   pathway.    Ventricles:   No hydrocephalus or midline shift.    Bones joints:   No calvarial lesions.    Soft tissues: No scalp hematoma.  No abnormality of the globes.    Sinuses: Clear.    Mastoid air cells:   No mastoid effusion.    IMPRESSION:       1.  No acute infarct, bleed, or acute intracranial abnormality.  2.  Normal exam.      Impression:          Electronically signed by Mimi  BETTY Ruiz on 04-27-23 at 0106    CT Head Without Contrast [[id number]] Collected: 04/26/23 2011     Updated: 04/26/23 2017    Narrative:      CT HEAD WO CONTRAST-     Radiation dose reduction techniques were utilized, including automated  exposure control and exposure modulation based on body size.     Clinical: 43-year-old female with blurred vision, lightheaded     CT findings: No intracranial hemorrhage, gross mass effect or edema.  There is good differentiation between the wording 3 matter. The  ventricles have a satisfactory appearance and the basilar cisterns are  preserved. The posterior fossa contents within normal limits. Temporal  bones have a symmetric satisfactory appearance. Internal auditory canals  are normal. No acute osseous abnormality or bone lesion seen. The globes  are symmetric and satisfactory in position. No orbital mass. Visualized  paranasal sinuses within normal limits.     CONCLUSION: No acute intracranial abnormality.        This report was finalized on 4/26/2023 8:14 PM by Dr. Roberto Marie M.D.             I have reviewed the medications.  ---------------------------------------------------------------------------------------------  Assessment & Plan   Assessment/Problem List    Blurred vision      Plan:    Aneurysm of the left ICA  Visual disturbance  Headache  -CT head without negative for acute intracranial findings  -MRI of the brain without negative acute  -Neurology consulted, recommended further evaluation with CTA of the head and neck  -CTA of the head and neck reveals 9 x 7 x 7 mm aneurysm of the left ICA terminus  -Neurosurgery has been consulted, plan for cerebral angiography tomorrow  -Patient admitted to medicine         Disposition: Admit to medicine, Dr. Beltran      This note will serve as a transfer summary.      50 minutes have been spent by [facility] providers in the care of this patient while under observation status.      I wore  an face mask, eye protection, and gloves during this patient encounter. Patient also wearing a surgical mask. Hand hygeine performed before and after seeing the patient.      Kaitlin Zapata PA-C 04/27/23 14:16 EDT

## 2023-04-27 NOTE — CONSULTS
[facility] NEUROSURGERY CONSULT NOTE    Patient name: Oliva Flores  Referring Provider: MD Sherley  Reason for Consultation: 10mm L ICA aneurysm    Patient Care Team:  Corbin Dc III, MD as PCP - General (Family Medicine)    Chief complaint: vision loss    Subjective .     History of present illness:    Patient is a 43 y.o. female with no past medical history who presented to the ED after 3 episodes of total loss of vision lasting 2-10 minutes and lightheadedness, currently back to baseline. Imaging demonstrates a 10mm ICA aneurysm. She is a long term smoker. She does not have a family history of aneurysms.     Review of Systems  Review of Systems   Constitutional: Negative for activity change and appetite change.   Eyes: Positive for visual disturbance.   Musculoskeletal: Negative for back pain and gait problem.   Neurological: Positive for dizziness and light-headedness. Negative for syncope and speech difficulty.   Psychiatric/Behavioral: Negative for agitation and confusion.       History  PAST MEDICAL HISTORY  Past Medical History:   Diagnosis Date   • Abnormal Pap smear of cervix        PAST SURGICAL HISTORY  Past Surgical History:   Procedure Laterality Date   • CERVICAL BIOPSY  W/ LOOP ELECTRODE EXCISION         FAMILY HISTORY  History reviewed. No pertinent family history.    SOCIAL HISTORY  Social History     Tobacco Use   • Smoking status: Every Day     Packs/day: 0.25     Types: Cigarettes   Vaping Use   • Vaping Use: Every day   Substance Use Topics   • Alcohol use: Yes     Comment: ocassional   • Drug use: No       Allergies:  Oxycodone and Phenergan [promethazine hcl]    MEDICATIONS:  No medications prior to admission.       Objective     Results Review:  LABS:    Admission on 04/26/2023   Component Date Value Ref Range Status   • Glucose 04/26/2023 103 (H)  65 - 99 mg/dL Final   • BUN 04/26/2023 14  6 - 20 mg/dL Final   • Creatinine 04/26/2023 0.81  0.57 - 1.00 mg/dL Final   • Sodium  04/26/2023 138  136 - 145 mmol/L Final   • Potassium 04/26/2023 4.1  3.5 - 5.2 mmol/L Final   • Chloride 04/26/2023 106  98 - 107 mmol/L Final   • CO2 04/26/2023 22.9  22.0 - 29.0 mmol/L Final   • Calcium 04/26/2023 9.5  8.6 - 10.5 mg/dL Final   • Total Protein 04/26/2023 6.7  6.0 - 8.5 g/dL Final   • Albumin 04/26/2023 4.5  3.5 - 5.2 g/dL Final   • ALT (SGPT) 04/26/2023 7  1 - 33 U/L Final   • AST (SGOT) 04/26/2023 12  1 - 32 U/L Final   • Alkaline Phosphatase 04/26/2023 37 (L)  39 - 117 U/L Final   • Total Bilirubin 04/26/2023 <0.2  0.0 - 1.2 mg/dL Final   • Globulin 04/26/2023 2.2  gm/dL Final   • A/G Ratio 04/26/2023 2.0  g/dL Final   • BUN/Creatinine Ratio 04/26/2023 17.3  7.0 - 25.0 Final   • Anion Gap 04/26/2023 9.1  5.0 - 15.0 mmol/L Final   • eGFR 04/26/2023 93.1  >60.0 mL/min/1.73 Final   • HCG Qualitative 04/26/2023 Negative  Negative Final   • Color, UA 04/26/2023 Yellow  Yellow, Straw Final   • Appearance, UA 04/26/2023 Clear  Clear Final   • pH, UA 04/26/2023 7.0  5.0 - 8.0 Final   • Specific Gravity, UA 04/26/2023 1.011  1.005 - 1.030 Final   • Glucose, UA 04/26/2023 Negative  Negative Final   • Ketones, UA 04/26/2023 Negative  Negative Final   • Bilirubin, UA 04/26/2023 Negative  Negative Final   • Blood, UA 04/26/2023 Negative  Negative Final   • Protein, UA 04/26/2023 Negative  Negative Final   • Leuk Esterase, UA 04/26/2023 Negative  Negative Final   • Nitrite, UA 04/26/2023 Negative  Negative Final   • Urobilinogen, UA 04/26/2023 0.2 E.U./dL  0.2 - 1.0 E.U./dL Final   • Sed Rate 04/26/2023 7  0 - 20 mm/hr Final   • WBC 04/26/2023 7.02  3.40 - 10.80 10*3/mm3 Final   • RBC 04/26/2023 3.92  3.77 - 5.28 10*6/mm3 Final   • Hemoglobin 04/26/2023 12.4  12.0 - 15.9 g/dL Final   • Hematocrit 04/26/2023 36.7  34.0 - 46.6 % Final   • MCV 04/26/2023 93.6  79.0 - 97.0 fL Final   • MCH 04/26/2023 31.6  26.6 - 33.0 pg Final   • MCHC 04/26/2023 33.8  31.5 - 35.7 g/dL Final   • RDW 04/26/2023 12.1 (L)  12.3 -  15.4 % Final   • RDW-SD 04/26/2023 41.2  37.0 - 54.0 fl Final   • MPV 04/26/2023 10.5  6.0 - 12.0 fL Final   • Platelets 04/26/2023 205  140 - 450 10*3/mm3 Final   • Neutrophil % 04/26/2023 50.8  42.7 - 76.0 % Final   • Lymphocyte % 04/26/2023 38.9  19.6 - 45.3 % Final   • Monocyte % 04/26/2023 6.8  5.0 - 12.0 % Final   • Eosinophil % 04/26/2023 2.3  0.3 - 6.2 % Final   • Basophil % 04/26/2023 1.1  0.0 - 1.5 % Final   • Immature Grans % 04/26/2023 0.1  0.0 - 0.5 % Final   • Neutrophils, Absolute 04/26/2023 3.56  1.70 - 7.00 10*3/mm3 Final   • Lymphocytes, Absolute 04/26/2023 2.73  0.70 - 3.10 10*3/mm3 Final   • Monocytes, Absolute 04/26/2023 0.48  0.10 - 0.90 10*3/mm3 Final   • Eosinophils, Absolute 04/26/2023 0.16  0.00 - 0.40 10*3/mm3 Final   • Basophils, Absolute 04/26/2023 0.08  0.00 - 0.20 10*3/mm3 Final   • Immature Grans, Absolute 04/26/2023 0.01  0.00 - 0.05 10*3/mm3 Final   • nRBC 04/26/2023 0.0  0.0 - 0.2 /100 WBC Final   • ADENOVIRUS, PCR 04/26/2023 Not Detected  Not Detected Final   • Coronavirus 229E 04/26/2023 Not Detected  Not Detected Final   • Coronavirus HKU1 04/26/2023 Not Detected  Not Detected Final   • Coronavirus NL63 04/26/2023 Not Detected  Not Detected Final   • Coronavirus OC43 04/26/2023 Not Detected  Not Detected Final   • COVID19 04/26/2023 Not Detected  Not Detected - Ref. Range Final   • Human Metapneumovirus 04/26/2023 Not Detected  Not Detected Final   • Human Rhinovirus/Enterovirus 04/26/2023 Not Detected  Not Detected Final   • Influenza A PCR 04/26/2023 Not Detected  Not Detected Final   • Influenza B PCR 04/26/2023 Not Detected  Not Detected Final   • Parainfluenza Virus 1 04/26/2023 Not Detected  Not Detected Final   • Parainfluenza Virus 2 04/26/2023 Not Detected  Not Detected Final   • Parainfluenza Virus 3 04/26/2023 Not Detected  Not Detected Final   • Parainfluenza Virus 4 04/26/2023 Not Detected  Not Detected Final   • RSV, PCR 04/26/2023 Not Detected  Not Detected Final    • Bordetella pertussis pcr 04/26/2023 Not Detected  Not Detected Final   • Bordetella parapertussis PCR 04/26/2023 Not Detected  Not Detected Final   • Chlamydophila pneumoniae PCR 04/26/2023 Not Detected  Not Detected Final   • Mycoplasma pneumo by PCR 04/26/2023 Not Detected  Not Detected Final   • Total Cholesterol 04/27/2023 130  0 - 200 mg/dL Final   • Triglycerides 04/27/2023 76  0 - 150 mg/dL Final   • HDL Cholesterol 04/27/2023 56  40 - 60 mg/dL Final   • LDL Cholesterol  04/27/2023 59  0 - 100 mg/dL Final   • VLDL Cholesterol 04/27/2023 15  5 - 40 mg/dL Final   • LDL/HDL Ratio 04/27/2023 1.05   Final   • Hemoglobin A1C 04/27/2023 5.20  4.80 - 5.60 % Final       DIAGNOSTICS:  MRI Brain Without Contrast    Result Date: 4/27/2023  Electronically signed by Mimi Ruiz M.D. on 04-27-23 at 0106    CT Angiogram Carotids    Result Date: 4/27/2023  1. The head CT demonstrates no convincing acute intracranial abnormality. 2. Mild cervical spondylosis is present with some disc space narrowing and degenerative endplate changes at C5-6 with posterior spurs contributing to mild canal narrowing and there are uncovertebral joint spurs that contribute to mild-to-moderate left and moderate right bony foraminal narrowing at C5-6. Otherwise the cervical spine is unremarkable. 3. The CT angiographic evaluation of the great vessels of the neck is normal with no stenosis seen in the great vessels in the neck, in particular no vertebral artery stenosis is seen and no stenosis is seen in the cervical segments of internal carotid arteries using the NASCET criteria. 4. CT angiographic evaluation of the head demonstrates an aneurysm projected anterior, superior laterally off the left internal carotid terminus. The aneurysm maximally measures 9 x 7 x 6 mm in medial lateral and anterior posterior and craniocaudal dimension and has a relatively narrow neck at its attachment to the left internal carotid terminus with the neck  measuring 3.5 x 3.5 mm. Endovascular neurosurgical consultation is warranted. 5. The remainder of the CT angiogram of the head and neck is within normal limits and the etiology of the visual changes is not clearly established on this exam, correlate clinically. The results were communicated to Kaitlin Zapata, the physician assistant in the [facility] observation unit taking care of the patient, by telephone on 04/27/2023 at 2:00 PM. I also communicated to Dr. Lepe from Stroke Neurology by telephone on 04/27/2023 at 2:05 PM.  Radiation dose reduction techniques were utilized, including automated exposure control and exposure modulation based on body size.       CT Angiogram Head    Result Date: 4/27/2023  1. The head CT demonstrates no convincing acute intracranial abnormality. 2. Mild cervical spondylosis is present with some disc space narrowing and degenerative endplate changes at C5-6 with posterior spurs contributing to mild canal narrowing and there are uncovertebral joint spurs that contribute to mild-to-moderate left and moderate right bony foraminal narrowing at C5-6. Otherwise the cervical spine is unremarkable. 3. The CT angiographic evaluation of the great vessels of the neck is normal with no stenosis seen in the great vessels in the neck, in particular no vertebral artery stenosis is seen and no stenosis is seen in the cervical segments of internal carotid arteries using the NASCET criteria. 4. CT angiographic evaluation of the head demonstrates an aneurysm projected anterior, superior laterally off the left internal carotid terminus. The aneurysm maximally measures 9 x 7 x 6 mm in medial lateral and anterior posterior and craniocaudal dimension and has a relatively narrow neck at its attachment to the left internal carotid terminus with the neck measuring 3.5 x 3.5 mm. Endovascular neurosurgical consultation is warranted. 5. The remainder of the CT angiogram of the head and neck is within normal limits  "and the etiology of the visual changes is not clearly established on this exam, correlate clinically. The results were communicated to Kaitlin Zapata, the physician assistant in the [facility] observation unit taking care of the patient, by telephone on 04/27/2023 at 2:00 PM. I also communicated to Dr. Lepe from Stroke Neurology by telephone on 04/27/2023 at 2:05 PM.  Radiation dose reduction techniques were utilized, including automated exposure control and exposure modulation based on body size.           Results Review:   I reviewed the patient's new clinical results.  I personally viewed and interpreted the patient's chart    Vital Signs   Temp:  [98.1 °F (36.7 °C)-99.4 °F (37.4 °C)] 98.6 °F (37 °C)  Heart Rate:  [59-97] 68  Resp:  [16-18] 18  BP: ()/(51-97) 117/62    Physical Exam:  Awake, alert, oriented x3. Speech clear with no aphasia  Visual fields full to confrontation  Extraocular movements are full, PERRL   Facial movements are symmetric. No weakness.  Normal muscle strength, bulk and tone in upper and lower extremities.  No fasciculations, rigidity, spasticity, or abnormal movements.  No pronator drift  Sensation normal to light touch; no extinction  Wearing SCDs in bed     Assessment & Plan       Blurred vision    Aneurysm of internal carotid artery    This is a 43-year-old female who presented to the ED with vision loss found to have a 10 mm ICA aneurysm.    PLAN:     · Plan for cerebral angiogram with intervention 4/28/2023  · N.p.o. after midnight  · Keep SBP less than 150  · Give 325 mg of ASA tonight  · 300 mg Plavix in a.m.  · Check P2Y12 in a.m.  · ICU after procedure  · smoking cessation    I discussed the patient's findings and my recommendations with Dr. Lopez, patient and nursing staff    Appropriate PPE was worn during the entire visit.  Hand hygiene was completed before and after.     Eloina Carpenter, APRN  04/27/23  15:23 EDT    \"Dictated utilizing Dragon dictation\".      "

## 2023-04-27 NOTE — PROGRESS NOTES
MD Attestation Note    I supervised care provided by the midlevel provider.    The KERON and I have discussed this patient's history, physical exam, and treatment plan. I have reviewed the documentation and personally had a face to face interaction with the patient  I affirm the documentation and agree with the treatment and plan.     I provided a substantive portion of the care of the patient.  I personally performed the physical exam in its entirety, and below are my findings.        Subjective  Pt is a 43 y.o. female admitted from Emergency Department for evaluation and treatment of intermittent visual loss, now resolved.    Physical Exam  GENERAL: Alert and in NAD, Vitals reviewed  HENT: nares patent  EYES: no scleral icterus  CV: regular rhythm, regular rate  RESPIRATORY: normal effort  ABDOMEN: soft  MUSCULOSKELETAL: no deformity  NEURO: Strength sensation and coordination are grossly intact.  Speech and mentation are unremarkable  SKIN: warm, dry    Assessment/Plan  I discussed tx and evaluation of this patient with MARCO Zapata.  MRI brain fortunately negative for acute pathology.  Awaiting neurology consultation.  Anticipate discharge assuming neurology clearance later today.

## 2023-04-27 NOTE — DISCHARGE INSTRUCTIONS
Continue all home medications as prescribed.  Follow-up with your primary care doctor 1 to 2 weeks.  If you continue to have symptoms of visual disturbance, been advised free to follow-up with cardiology for further work-up.    Return to the emergency department with worsening symptoms, uncontrolled pain, inability to tolerate oral liquids, fever greater than 101°F not controlled by Tylenol or as needed with emergent concerns.    Neurosurgery office will call to arrange for follow-up.

## 2023-04-27 NOTE — PLAN OF CARE
Goal Outcome Evaluation:  Plan of Care Reviewed With: patient, spouse   Pt to be admitted inpatient for cerebral arterial gram with intervention for 10mm L ICA aneurysm found on CTA this afternoon. Pt has seen Neurosurgery APRN's and Dr. Lepe with neurology talked with pt. Pt alert and orient times 4, NAD, up ad tamika, VSS. Will continue to monitor. Report to 5S. Room not clean at this time.      Progress: no change

## 2023-04-27 NOTE — CONSULTS
Neurology consult Note    Patient Name: Oliva Flores   MRN: [id number]  Age: 43 y.o.  Sex: female  : 1980    Primary Care Physician: Corbin Dc III, MD  Referring Physician:  No ref. provider found      Handedness: Right  Race: White    Chief Complaint/Reason for Consultation: Lightheadedness and vision blacking out    Subjective .  HPI: 43-year-old right-handed white female with no major past medical history, who comes in with 3 episodes of lightheadedness and blacking out of vision without losing consciousness, lasting 2 to 5 minutes.  No other focal weakness/numbness/speech changes.  She feels back to her baseline.      Review of Systems   Eyes: Positive for visual disturbance.   Neurological: Positive for light-headedness.   All other systems reviewed and are negative.     Past Medical History:   Diagnosis Date   • Abnormal Pap smear of cervix      Past Surgical History:   Procedure Laterality Date   • CERVICAL BIOPSY  W/ LOOP ELECTRODE EXCISION       History reviewed. No pertinent family history.  Social History     Socioeconomic History   • Marital status:    Tobacco Use   • Smoking status: Every Day     Packs/day: 0.25     Types: Cigarettes   Vaping Use   • Vaping Use: Every day   Substance and Sexual Activity   • Alcohol use: Yes     Comment: ocassional   • Drug use: No   • Sexual activity: Yes     Allergies   Allergen Reactions   • Oxycodone Itching   • Phenergan [Promethazine Hcl] GI Intolerance     Prior to Admission medications    Not on File             Objective     Temp:  [98.1 °F (36.7 °C)-99.4 °F (37.4 °C)] 98.6 °F (37 °C)  Heart Rate:  [59-97] 59  Resp:  [16-18] 16  BP: ()/(51-97) 96/51    Neurological Exam  Mental Status  Awake, alert and oriented to person, place and time. Speech is normal. Language is fluent with no aphasia. Attention and concentration are normal. Fund of knowledge is appropriate for level of education.    Cranial Nerves  CN II: Visual  fields full to confrontation.  CN III, IV, VI: Extraocular movements intact bilaterally. Normal lids and orbits bilaterally. Pupils equal round and reactive to light bilaterally.  CN V: Facial sensation is normal.  CN VII: Full and symmetric facial movement.  CN VIII: Equal hearing bilaterally.  CN IX, X: Palate elevates symmetrically  CN XI: Shoulder shrug strength is normal.  CN XII: Tongue midline without atrophy or fasciculations.    Motor  Normal muscle bulk throughout. No fasciculations present. Strength is 5/5 throughout all four extremities.    Sensory  Light touch is normal in upper and lower extremities.     Reflexes  Deep tendon reflexes are 2+ and symmetric in all four extremities with downgoing toes bilaterally.    Coordination  No dysmetria.    Gait  Not assessed    Physical Exam  Vitals and nursing note reviewed.   Constitutional:       Appearance: Normal appearance.   HENT:      Head: Normocephalic and atraumatic.      Mouth/Throat:      Mouth: Mucous membranes are moist.      Pharynx: Oropharynx is clear.   Cardiovascular:      Rate and Rhythm: Normal rate and regular rhythm.   Pulmonary:      Effort: Pulmonary effort is normal. No respiratory distress.   Musculoskeletal:      Cervical back: Normal range of motion and neck supple.   Neurological:      Mental Status: She is alert.   Psychiatric:         Mood and Affect: Mood normal.         Behavior: Behavior normal.           Hospital Meds:  Scheduled- sodium chloride, 10 mL, Intravenous, Q12H      Infusions-     PRNs- •  [COMPLETED] Insert Peripheral IV **AND** sodium chloride  •  sodium chloride  •  sodium chloride      Results Reviewed:  I have personally reviewed current lab, radiology, and data   CT head and MRI is negative for any acute findings  Orthostatics were negative as well              Assessment/Plan:      1. Lightheadedness.  Her symptoms are concerning more for presyncope rather than cerebrovascular cause.  Her CT head and MRI is  negative, but her orthostatics are also negative.  Will get CT angiogram of head and neck, which if negative no further neuro work-up.  If she continues to have symptoms, she may need outpatient cardiology follow-up.  2. Out of bed as tolerated.    Case was discussed with patient, nursing and the primary team.  Thank you for the consult.    Addendum-  CT angiogram of head and neck shows a large left distal ICA saccular aneurysm, which is about 9 x 6 mm.  This is likely incidental in finding, cannot explain patient's symptoms above.  She will need treatment for this aneurysm, and I have discussed with neuro interventionalist team, who will perform cerebral angio tomorrow morning and coiling.  Patient will be admitted to the hospital.      Satnam Lepe MD  April 27, 2023  13:19 EDT

## 2023-04-27 NOTE — PLAN OF CARE
Goal Outcome Evaluation:         Pt admitted for further evaluation of intermittent vision loss in both eyes. Pt not experiencing the vision loss at this time. The only other symptom that pt has mentioned is lightheadedness with standing. MRI complete.    Neurology consulted

## 2023-04-27 NOTE — PLAN OF CARE
Goal Outcome Evaluation:      Pt admitted for vision loss. Neuro consulted. Daughter at bedside.

## 2023-04-28 ENCOUNTER — ANESTHESIA EVENT (OUTPATIENT)
Dept: INTERVENTIONAL RADIOLOGY/VASCULAR | Facility: HOSPITAL | Age: 43
DRG: 27 | End: 2023-04-28

## 2023-04-28 ENCOUNTER — ANESTHESIA (OUTPATIENT)
Dept: INTERVENTIONAL RADIOLOGY/VASCULAR | Facility: HOSPITAL | Age: 43
DRG: 27 | End: 2023-04-28

## 2023-04-28 ENCOUNTER — APPOINTMENT (OUTPATIENT)
Dept: INTERVENTIONAL RADIOLOGY/VASCULAR | Facility: HOSPITAL | Age: 43
DRG: 27 | End: 2023-04-28

## 2023-04-28 LAB
GLUCOSE BLDC GLUCOMTR-MCNC: 89 MG/DL (ref 70–130)
GLUCOSE BLDC GLUCOMTR-MCNC: 94 MG/DL (ref 70–130)
GLUCOSE BLDC GLUCOMTR-MCNC: 95 MG/DL (ref 70–130)
PA ADP PRP-ACNC: 154 PRU (ref 194–418)
PA ADP PRP-ACNC: 182 PRU (ref 194–418)
QT INTERVAL: 386 MS

## 2023-04-28 PROCEDURE — 61624 TCAT PERM OCCLS/EMBOLJ CNS: CPT

## 2023-04-28 PROCEDURE — 25010000002 ONDANSETRON PER 1 MG: Performed by: INTERNAL MEDICINE

## 2023-04-28 PROCEDURE — 93010 ELECTROCARDIOGRAM REPORT: CPT | Performed by: INTERNAL MEDICINE

## 2023-04-28 PROCEDURE — 25010000002 DEXAMETHASONE SODIUM PHOSPHATE 20 MG/5ML SOLUTION: Performed by: ANESTHESIOLOGY

## 2023-04-28 PROCEDURE — 82948 REAGENT STRIP/BLOOD GLUCOSE: CPT

## 2023-04-28 PROCEDURE — 61624 TCAT PERM OCCLS/EMBOLJ CNS: CPT | Performed by: RADIOLOGY

## 2023-04-28 PROCEDURE — 75898 FOLLOW-UP ANGIOGRAPHY: CPT | Performed by: RADIOLOGY

## 2023-04-28 PROCEDURE — 85347 COAGULATION TIME ACTIVATED: CPT

## 2023-04-28 PROCEDURE — 25010000002 LORAZEPAM PER 2 MG: Performed by: STUDENT IN AN ORGANIZED HEALTH CARE EDUCATION/TRAINING PROGRAM

## 2023-04-28 PROCEDURE — 25010000002 HEPARIN (PORCINE) PER 1000 UNITS: Performed by: RADIOLOGY

## 2023-04-28 PROCEDURE — 25010000002 MAGNESIUM SULFATE PER 500 MG OF MAGNESIUM: Performed by: ANESTHESIOLOGY

## 2023-04-28 PROCEDURE — 76377 3D RENDER W/INTRP POSTPROCES: CPT | Performed by: RADIOLOGY

## 2023-04-28 PROCEDURE — C1769 GUIDE WIRE: HCPCS

## 2023-04-28 PROCEDURE — 25010000002 PROTAMINE SULFATE PER 10 MG: Performed by: ANESTHESIOLOGY

## 2023-04-28 PROCEDURE — 0 IODIXANOL PER 1 ML: Performed by: RADIOLOGY

## 2023-04-28 PROCEDURE — C1884 EMBOLIZATION PROTECT SYST: HCPCS

## 2023-04-28 PROCEDURE — 25010000002 PROPOFOL 10 MG/ML EMULSION: Performed by: ANESTHESIOLOGY

## 2023-04-28 PROCEDURE — C1887 CATHETER, GUIDING: HCPCS

## 2023-04-28 PROCEDURE — 25010000002 SUGAMMADEX 200 MG/2ML SOLUTION: Performed by: ANESTHESIOLOGY

## 2023-04-28 PROCEDURE — C1894 INTRO/SHEATH, NON-LASER: HCPCS

## 2023-04-28 PROCEDURE — 85576 BLOOD PLATELET AGGREGATION: CPT | Performed by: RADIOLOGY

## 2023-04-28 PROCEDURE — C1760 CLOSURE DEV, VASC: HCPCS

## 2023-04-28 PROCEDURE — 25010000002 HEPARIN (PORCINE) PER 1000 UNITS: Performed by: ANESTHESIOLOGY

## 2023-04-28 PROCEDURE — 25010000002 ONDANSETRON PER 1 MG: Performed by: ANESTHESIOLOGY

## 2023-04-28 PROCEDURE — 85576 BLOOD PLATELET AGGREGATION: CPT | Performed by: NURSE PRACTITIONER

## 2023-04-28 PROCEDURE — 75894 X-RAYS TRANSCATH THERAPY: CPT

## 2023-04-28 PROCEDURE — 25010000002 LORAZEPAM PER 2 MG: Performed by: INTERNAL MEDICINE

## 2023-04-28 PROCEDURE — 03VG3DZ RESTRICTION OF INTRACRANIAL ARTERY WITH INTRALUMINAL DEVICE, PERCUTANEOUS APPROACH: ICD-10-PCS | Performed by: RADIOLOGY

## 2023-04-28 PROCEDURE — 75894 X-RAYS TRANSCATH THERAPY: CPT | Performed by: RADIOLOGY

## 2023-04-28 PROCEDURE — 36224 PLACE CATH CAROTD ART: CPT | Performed by: RADIOLOGY

## 2023-04-28 PROCEDURE — 76377 3D RENDER W/INTRP POSTPROCES: CPT

## 2023-04-28 PROCEDURE — 93005 ELECTROCARDIOGRAM TRACING: CPT | Performed by: STUDENT IN AN ORGANIZED HEALTH CARE EDUCATION/TRAINING PROGRAM

## 2023-04-28 PROCEDURE — 75898 FOLLOW-UP ANGIOGRAPHY: CPT

## 2023-04-28 PROCEDURE — 36226 PLACE CATH VERTEBRAL ART: CPT | Performed by: RADIOLOGY

## 2023-04-28 RX ORDER — FENTANYL CITRATE 50 UG/ML
50 INJECTION, SOLUTION INTRAMUSCULAR; INTRAVENOUS ONCE AS NEEDED
Status: DISCONTINUED | OUTPATIENT
Start: 2023-04-28 | End: 2023-04-29 | Stop reason: HOSPADM

## 2023-04-28 RX ORDER — SODIUM CHLORIDE 9 MG/ML
40 INJECTION, SOLUTION INTRAVENOUS AS NEEDED
Status: DISCONTINUED | OUTPATIENT
Start: 2023-04-28 | End: 2023-04-28

## 2023-04-28 RX ORDER — PROTAMINE SULFATE 10 MG/ML
INJECTION, SOLUTION INTRAVENOUS AS NEEDED
Status: DISCONTINUED | OUTPATIENT
Start: 2023-04-28 | End: 2023-04-28 | Stop reason: SURG

## 2023-04-28 RX ORDER — SODIUM CHLORIDE, SODIUM LACTATE, POTASSIUM CHLORIDE, CALCIUM CHLORIDE 600; 310; 30; 20 MG/100ML; MG/100ML; MG/100ML; MG/100ML
9 INJECTION, SOLUTION INTRAVENOUS CONTINUOUS
Status: DISCONTINUED | OUTPATIENT
Start: 2023-04-28 | End: 2023-04-29 | Stop reason: HOSPADM

## 2023-04-28 RX ORDER — ONDANSETRON 2 MG/ML
INJECTION INTRAMUSCULAR; INTRAVENOUS AS NEEDED
Status: DISCONTINUED | OUTPATIENT
Start: 2023-04-28 | End: 2023-04-28 | Stop reason: SURG

## 2023-04-28 RX ORDER — HYDROMORPHONE HYDROCHLORIDE 1 MG/ML
0.5 INJECTION, SOLUTION INTRAMUSCULAR; INTRAVENOUS; SUBCUTANEOUS
Status: DISCONTINUED | OUTPATIENT
Start: 2023-04-28 | End: 2023-04-29 | Stop reason: HOSPADM

## 2023-04-28 RX ORDER — LIDOCAINE HYDROCHLORIDE 10 MG/ML
0.5 INJECTION, SOLUTION EPIDURAL; INFILTRATION; INTRACAUDAL; PERINEURAL ONCE AS NEEDED
Status: DISCONTINUED | OUTPATIENT
Start: 2023-04-28 | End: 2023-04-29 | Stop reason: HOSPADM

## 2023-04-28 RX ORDER — DROPERIDOL 2.5 MG/ML
0.62 INJECTION, SOLUTION INTRAMUSCULAR; INTRAVENOUS
Status: DISCONTINUED | OUTPATIENT
Start: 2023-04-28 | End: 2023-04-29 | Stop reason: HOSPADM

## 2023-04-28 RX ORDER — OXYCODONE AND ACETAMINOPHEN 7.5; 325 MG/1; MG/1
1 TABLET ORAL EVERY 4 HOURS PRN
Status: DISCONTINUED | OUTPATIENT
Start: 2023-04-28 | End: 2023-04-29 | Stop reason: HOSPADM

## 2023-04-28 RX ORDER — DEXAMETHASONE SODIUM PHOSPHATE 4 MG/ML
INJECTION, SOLUTION INTRA-ARTICULAR; INTRALESIONAL; INTRAMUSCULAR; INTRAVENOUS; SOFT TISSUE AS NEEDED
Status: DISCONTINUED | OUTPATIENT
Start: 2023-04-28 | End: 2023-04-28 | Stop reason: SURG

## 2023-04-28 RX ORDER — SODIUM CHLORIDE 0.9 % (FLUSH) 0.9 %
1-10 SYRINGE (ML) INJECTION AS NEEDED
Status: DISCONTINUED | OUTPATIENT
Start: 2023-04-28 | End: 2023-04-28

## 2023-04-28 RX ORDER — SODIUM CHLORIDE 0.9 % (FLUSH) 0.9 %
10 SYRINGE (ML) INJECTION EVERY 12 HOURS SCHEDULED
Status: DISCONTINUED | OUTPATIENT
Start: 2023-04-28 | End: 2023-04-28

## 2023-04-28 RX ORDER — LABETALOL HYDROCHLORIDE 5 MG/ML
5 INJECTION, SOLUTION INTRAVENOUS
Status: DISCONTINUED | OUTPATIENT
Start: 2023-04-28 | End: 2023-04-29 | Stop reason: HOSPADM

## 2023-04-28 RX ORDER — SODIUM CHLORIDE, SODIUM LACTATE, POTASSIUM CHLORIDE, CALCIUM CHLORIDE 600; 310; 30; 20 MG/100ML; MG/100ML; MG/100ML; MG/100ML
INJECTION, SOLUTION INTRAVENOUS CONTINUOUS PRN
Status: DISCONTINUED | OUTPATIENT
Start: 2023-04-28 | End: 2023-04-28 | Stop reason: SURG

## 2023-04-28 RX ORDER — FAMOTIDINE 10 MG/ML
20 INJECTION, SOLUTION INTRAVENOUS ONCE
Status: CANCELLED | OUTPATIENT
Start: 2023-04-28 | End: 2023-04-28

## 2023-04-28 RX ORDER — ONDANSETRON 2 MG/ML
4 INJECTION INTRAMUSCULAR; INTRAVENOUS EVERY 6 HOURS PRN
Status: DISCONTINUED | OUTPATIENT
Start: 2023-04-28 | End: 2023-04-29 | Stop reason: HOSPADM

## 2023-04-28 RX ORDER — FENTANYL CITRATE 50 UG/ML
50 INJECTION, SOLUTION INTRAMUSCULAR; INTRAVENOUS
Status: DISCONTINUED | OUTPATIENT
Start: 2023-04-28 | End: 2023-04-29 | Stop reason: HOSPADM

## 2023-04-28 RX ORDER — HYDRALAZINE HYDROCHLORIDE 20 MG/ML
5 INJECTION INTRAMUSCULAR; INTRAVENOUS
Status: DISCONTINUED | OUTPATIENT
Start: 2023-04-28 | End: 2023-04-29 | Stop reason: HOSPADM

## 2023-04-28 RX ORDER — EPHEDRINE SULFATE 50 MG/ML
5 INJECTION, SOLUTION INTRAVENOUS ONCE AS NEEDED
Status: DISCONTINUED | OUTPATIENT
Start: 2023-04-28 | End: 2023-04-29 | Stop reason: HOSPADM

## 2023-04-28 RX ORDER — ASPIRIN 81 MG/1
81 TABLET, CHEWABLE ORAL DAILY
Status: DISCONTINUED | OUTPATIENT
Start: 2023-04-28 | End: 2023-04-29 | Stop reason: HOSPADM

## 2023-04-28 RX ORDER — HYDROXYZINE HYDROCHLORIDE 25 MG/1
25 TABLET, FILM COATED ORAL 3 TIMES DAILY PRN
Status: DISCONTINUED | OUTPATIENT
Start: 2023-04-28 | End: 2023-04-29 | Stop reason: HOSPADM

## 2023-04-28 RX ORDER — IPRATROPIUM BROMIDE AND ALBUTEROL SULFATE 2.5; .5 MG/3ML; MG/3ML
3 SOLUTION RESPIRATORY (INHALATION) ONCE AS NEEDED
Status: DISCONTINUED | OUTPATIENT
Start: 2023-04-28 | End: 2023-04-29 | Stop reason: HOSPADM

## 2023-04-28 RX ORDER — FLUMAZENIL 0.1 MG/ML
0.2 INJECTION INTRAVENOUS AS NEEDED
Status: DISCONTINUED | OUTPATIENT
Start: 2023-04-28 | End: 2023-04-29 | Stop reason: HOSPADM

## 2023-04-28 RX ORDER — ACETAMINOPHEN 650 MG/1
650 SUPPOSITORY RECTAL EVERY 4 HOURS PRN
Status: DISCONTINUED | OUTPATIENT
Start: 2023-04-28 | End: 2023-04-29 | Stop reason: HOSPADM

## 2023-04-28 RX ORDER — PROMETHAZINE HYDROCHLORIDE 25 MG/1
25 TABLET ORAL ONCE AS NEEDED
Status: DISCONTINUED | OUTPATIENT
Start: 2023-04-28 | End: 2023-04-29 | Stop reason: HOSPADM

## 2023-04-28 RX ORDER — PROMETHAZINE HYDROCHLORIDE 25 MG/1
25 SUPPOSITORY RECTAL ONCE AS NEEDED
Status: DISCONTINUED | OUTPATIENT
Start: 2023-04-28 | End: 2023-04-29 | Stop reason: HOSPADM

## 2023-04-28 RX ORDER — SCOLOPAMINE TRANSDERMAL SYSTEM 1 MG/1
1 PATCH, EXTENDED RELEASE TRANSDERMAL
Status: DISCONTINUED | OUTPATIENT
Start: 2023-04-28 | End: 2023-04-29 | Stop reason: HOSPADM

## 2023-04-28 RX ORDER — LORAZEPAM 2 MG/ML
1 INJECTION INTRAMUSCULAR ONCE
Status: COMPLETED | OUTPATIENT
Start: 2023-04-28 | End: 2023-04-28

## 2023-04-28 RX ORDER — LIDOCAINE HYDROCHLORIDE 20 MG/ML
INJECTION, SOLUTION INFILTRATION; PERINEURAL AS NEEDED
Status: DISCONTINUED | OUTPATIENT
Start: 2023-04-28 | End: 2023-04-28 | Stop reason: SURG

## 2023-04-28 RX ORDER — SODIUM CHLORIDE 0.9 % (FLUSH) 0.9 %
3-10 SYRINGE (ML) INJECTION AS NEEDED
Status: DISCONTINUED | OUTPATIENT
Start: 2023-04-28 | End: 2023-04-29 | Stop reason: HOSPADM

## 2023-04-28 RX ORDER — NALOXONE HCL 0.4 MG/ML
0.2 VIAL (ML) INJECTION AS NEEDED
Status: DISCONTINUED | OUTPATIENT
Start: 2023-04-28 | End: 2023-04-29 | Stop reason: HOSPADM

## 2023-04-28 RX ORDER — MIDAZOLAM HYDROCHLORIDE 1 MG/ML
1 INJECTION INTRAMUSCULAR; INTRAVENOUS
Status: DISCONTINUED | OUTPATIENT
Start: 2023-04-28 | End: 2023-04-29 | Stop reason: HOSPADM

## 2023-04-28 RX ORDER — ROCURONIUM BROMIDE 10 MG/ML
INJECTION, SOLUTION INTRAVENOUS AS NEEDED
Status: DISCONTINUED | OUTPATIENT
Start: 2023-04-28 | End: 2023-04-28 | Stop reason: SURG

## 2023-04-28 RX ORDER — HYDROCODONE BITARTRATE AND ACETAMINOPHEN 7.5; 325 MG/1; MG/1
1 TABLET ORAL ONCE AS NEEDED
Status: DISCONTINUED | OUTPATIENT
Start: 2023-04-28 | End: 2023-04-29 | Stop reason: HOSPADM

## 2023-04-28 RX ORDER — IODIXANOL 320 MG/ML
200 INJECTION, SOLUTION INTRAVASCULAR
Status: COMPLETED | OUTPATIENT
Start: 2023-04-28 | End: 2023-04-28

## 2023-04-28 RX ORDER — MAGNESIUM SULFATE HEPTAHYDRATE 500 MG/ML
INJECTION, SOLUTION INTRAMUSCULAR; INTRAVENOUS AS NEEDED
Status: DISCONTINUED | OUTPATIENT
Start: 2023-04-28 | End: 2023-04-28 | Stop reason: SURG

## 2023-04-28 RX ORDER — SODIUM CHLORIDE 0.9 % (FLUSH) 0.9 %
3 SYRINGE (ML) INJECTION EVERY 12 HOURS SCHEDULED
Status: DISCONTINUED | OUTPATIENT
Start: 2023-04-28 | End: 2023-04-29 | Stop reason: HOSPADM

## 2023-04-28 RX ORDER — HEPARIN SODIUM 1000 [USP'U]/ML
INJECTION, SOLUTION INTRAVENOUS; SUBCUTANEOUS AS NEEDED
Status: DISCONTINUED | OUTPATIENT
Start: 2023-04-28 | End: 2023-04-28 | Stop reason: SURG

## 2023-04-28 RX ORDER — ONDANSETRON 2 MG/ML
4 INJECTION INTRAMUSCULAR; INTRAVENOUS ONCE AS NEEDED
Status: DISCONTINUED | OUTPATIENT
Start: 2023-04-28 | End: 2023-04-29 | Stop reason: HOSPADM

## 2023-04-28 RX ORDER — LORAZEPAM 2 MG/ML
1.5 INJECTION INTRAMUSCULAR ONCE
Status: COMPLETED | OUTPATIENT
Start: 2023-04-28 | End: 2023-04-28

## 2023-04-28 RX ORDER — ACETAMINOPHEN 325 MG/1
650 TABLET ORAL EVERY 4 HOURS PRN
Status: DISCONTINUED | OUTPATIENT
Start: 2023-04-28 | End: 2023-04-29 | Stop reason: HOSPADM

## 2023-04-28 RX ORDER — PROPOFOL 10 MG/ML
VIAL (ML) INTRAVENOUS AS NEEDED
Status: DISCONTINUED | OUTPATIENT
Start: 2023-04-28 | End: 2023-04-28 | Stop reason: SURG

## 2023-04-28 RX ORDER — DIPHENHYDRAMINE HYDROCHLORIDE 50 MG/ML
12.5 INJECTION INTRAMUSCULAR; INTRAVENOUS
Status: DISCONTINUED | OUTPATIENT
Start: 2023-04-28 | End: 2023-04-28

## 2023-04-28 RX ADMIN — Medication 10 ML: at 23:13

## 2023-04-28 RX ADMIN — SUGAMMADEX 200 MG: 100 INJECTION, SOLUTION INTRAVENOUS at 15:40

## 2023-04-28 RX ADMIN — LORAZEPAM 1.5 MG: 2 INJECTION INTRAMUSCULAR; INTRAVENOUS at 06:51

## 2023-04-28 RX ADMIN — PROPOFOL 150 MG: 10 INJECTION, EMULSION INTRAVENOUS at 13:46

## 2023-04-28 RX ADMIN — Medication 10 ML: at 09:46

## 2023-04-28 RX ADMIN — IODIXANOL 193 ML: 320 INJECTION, SOLUTION INTRAVASCULAR at 16:27

## 2023-04-28 RX ADMIN — PROTAMINE SULFATE 50 MG: 10 INJECTION, SOLUTION INTRAVENOUS at 15:07

## 2023-04-28 RX ADMIN — CLOPIDOGREL BISULFATE 300 MG: 75 TABLET, FILM COATED ORAL at 06:04

## 2023-04-28 RX ADMIN — HEPARIN SODIUM: 1000 INJECTION INTRAVENOUS; SUBCUTANEOUS at 14:06

## 2023-04-28 RX ADMIN — ROCURONIUM BROMIDE 10 MG: 10 INJECTION, SOLUTION INTRAVENOUS at 14:17

## 2023-04-28 RX ADMIN — PROPOFOL 20 MG: 10 INJECTION, EMULSION INTRAVENOUS at 13:10

## 2023-04-28 RX ADMIN — PROPOFOL 30 MG: 10 INJECTION, EMULSION INTRAVENOUS at 13:13

## 2023-04-28 RX ADMIN — ONDANSETRON 4 MG: 2 INJECTION INTRAMUSCULAR; INTRAVENOUS at 09:33

## 2023-04-28 RX ADMIN — HEPARIN SODIUM: 1000 INJECTION INTRAVENOUS; SUBCUTANEOUS at 14:05

## 2023-04-28 RX ADMIN — LIDOCAINE HYDROCHLORIDE 100 MG: 20 INJECTION, SOLUTION INFILTRATION; PERINEURAL at 13:46

## 2023-04-28 RX ADMIN — LORAZEPAM 1 MG: 2 INJECTION INTRAMUSCULAR; INTRAVENOUS at 11:42

## 2023-04-28 RX ADMIN — HEPARIN SODIUM: 1000 INJECTION INTRAVENOUS; SUBCUTANEOUS at 14:07

## 2023-04-28 RX ADMIN — HEPARIN SODIUM 5000 UNITS: 1000 INJECTION INTRAVENOUS; SUBCUTANEOUS at 14:58

## 2023-04-28 RX ADMIN — DEXAMETHASONE SODIUM PHOSPHATE 8 MG: 4 INJECTION, SOLUTION INTRAMUSCULAR; INTRAVENOUS at 13:57

## 2023-04-28 RX ADMIN — MAGNESIUM SULFATE HEPTAHYDRATE 2 G: 500 INJECTION, SOLUTION INTRAMUSCULAR; INTRAVENOUS at 13:46

## 2023-04-28 RX ADMIN — SODIUM CHLORIDE, POTASSIUM CHLORIDE, SODIUM LACTATE AND CALCIUM CHLORIDE: 600; 310; 30; 20 INJECTION, SOLUTION INTRAVENOUS at 13:00

## 2023-04-28 RX ADMIN — ASPIRIN 81 MG: 81 TABLET, CHEWABLE ORAL at 09:33

## 2023-04-28 RX ADMIN — Medication 3 ML: at 23:13

## 2023-04-28 RX ADMIN — ROCURONIUM BROMIDE 40 MG: 10 INJECTION, SOLUTION INTRAVENOUS at 13:46

## 2023-04-28 RX ADMIN — ONDANSETRON 4 MG: 2 INJECTION INTRAMUSCULAR; INTRAVENOUS at 15:12

## 2023-04-28 RX ADMIN — ROCURONIUM BROMIDE 10 MG: 10 INJECTION, SOLUTION INTRAVENOUS at 14:45

## 2023-04-28 NOTE — BRIEF OP NOTE
Progress Note    Oliva Flores      Pre-op Diagnosis:   Left ICA Terminus Aneurysm       Post-Op Diagnosis Codes:  Same    Procedure/CPT® Codes:    Cerebral Embolization    Anesthesia: General ETT Anesthesia    Staff:   Jeremias Lopez MD  See Nursing Notes      Estimated Blood Loss: 150 cc    Urine Voided: See Nursing Notes    Specimens:                None          Drains:   Urethral Catheter Temperature probe 16 Fr. (Active)       Findings: 9 mm LICA terminus aneurysm with successful WEB embolization. No additional aneurysms, AV shunting, stenosis or hypervascular lesion. Official report to follow.        Complications: None encountered.          Jeremias Lopez MD     Date: 4/28/2023  Time: 15:23 EDT

## 2023-04-28 NOTE — ANESTHESIA PREPROCEDURE EVALUATION
Anesthesia Evaluation     Patient summary reviewed and Nursing notes reviewed   no history of anesthetic complications:  NPO Solid Status: > 8 hours  NPO Liquid Status: > 8 hours           Airway   Mallampati: II  TM distance: >3 FB  Neck ROM: full  No difficulty expected  Dental - normal exam     Pulmonary    (+) a smoker Current Abstained day of surgery,   (-) asthma, sleep apnea, rhonchi, decreased breath sounds, wheezes  Cardiovascular   Exercise tolerance: good (4-7 METS)    Rhythm: regular  Rate: normal    (+)  carotid artery disease (L ICA anyeursm ) left carotid  (-) hypertension, CAD, dysrhythmias, angina, HUNTER, murmur      Neuro/Psych  (-) seizures, CVA    ROS Comment: Recent transient vision loss  GI/Hepatic/Renal/Endo    (-) liver disease, no renal disease, diabetes, no thyroid disorder    Musculoskeletal     Abdominal     Abdomen: soft.   Substance History      OB/GYN          Other                        Anesthesia Plan    ASA 3     general and Delisa     intravenous induction     Anesthetic plan, risks, benefits, and alternatives have been provided, discussed and informed consent has been obtained with: patient.        CODE STATUS:    Level Of Support Discussed With: Patient  Code Status (Patient has no pulse and is not breathing): CPR (Attempt to Resuscitate)  Medical Interventions (Patient has pulse or is breathing): Full Support

## 2023-04-28 NOTE — ANESTHESIA PROCEDURE NOTES
Airway  Date/Time: 4/28/2023 1:50 PM  Airway not difficult    General Information and Staff    Anesthesiologist: Dany Coe MD    Indications and Patient Condition    Preoxygenated: yes  Mask difficulty assessment: 1 - vent by mask    Final Airway Details  Final airway type: endotracheal airway      Successful airway: ETT  Cuffed: yes   Successful intubation technique: direct laryngoscopy  Endotracheal tube insertion site: oral  Blade: Gross  Blade size: 2  ETT size (mm): 7.5  Cormack-Lehane Classification: grade I - full view of glottis  Placement verified by: chest auscultation and capnometry   Measured from: teeth  ETT/EBT  to teeth (cm): 22  Assessment: lips, teeth, and gum same as pre-op and atraumatic intubation

## 2023-04-28 NOTE — CONSULTS
"CONSULT NOTE    Patient Identification:  Oliva Flores  43 y.o.  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  Postop in the ICU reports double vision when looking on the left side.  No neuro deficits at this time.  Denies any headache nausea or vomiting.  History of tobacco abuse but no history of alcohol abuse.  No other medical history.  No chest pain or shortness of breath no nausea vomiting diarrhea      Review of Systems  As above rest is negative  Past Medical History:  Past Medical History:   Diagnosis Date   • Abnormal Pap smear of cervix        Past Surgical History:  Past Surgical History:   Procedure Laterality Date   • CERVICAL BIOPSY  W/ LOOP ELECTRODE EXCISION          Home Meds:  No medications prior to admission.       Allergies:  Allergies   Allergen Reactions   • Oxycodone Itching   • Phenergan [Promethazine Hcl] GI Intolerance       Social History:   Social History     Socioeconomic History   • Marital status:    Tobacco Use   • Smoking status: Every Day     Packs/day: 0.25     Types: Cigarettes   Vaping Use   • Vaping Use: Every day   Substance and Sexual Activity   • Alcohol use: Yes     Comment: ocassional   • Drug use: No   • Sexual activity: Yes       Family History:  History reviewed. No pertinent family history.    Physical Exam:  BP 98/55 (BP Location: Left arm, Patient Position: Lying)   Pulse 87   Temp 98.6 °F (37 °C) (Oral)   Resp 16   Ht 162.6 cm (64\")   Wt 52.4 kg (115 lb 9.6 oz)   LMP 04/12/2023 (Approximate)   SpO2 97%   BMI 19.83 kg/m²  Body mass index is 19.83 kg/m². 97% 52.4 kg (115 lb 9.6 oz)  Physical Exam  Patient is examined using the personal protective equipment as per guidelines from " infection control for this particular patient as enacted.  Hand hygiene was performed before and after patient interaction.  Well-developed normal body habitus  Eyes normal conjunctivae and pupils reactive to light  ENT Mallampati between 3 and 4 normal nasal exam  Neck midline trachea no thyromegaly  Chest no labored breathing clear  CVS regular rate and rhythm no lower extremity edema  Abdomen soft nontender no hepatosplenomegaly  CNS intact normal sensory exam  Skin no rashes no nodules  Psych oriented to time place and person normal memory  Musculoskeletal no cyanosis no clubbing normal range of motion        LABS:  Lab Results   Component Value Date    CALCIUM 9.5 04/26/2023     Results from last 7 days   Lab Units 04/26/23  1922   SODIUM mmol/L 138   POTASSIUM mmol/L 4.1   CHLORIDE mmol/L 106   CO2 mmol/L 22.9   BUN mg/dL 14   CREATININE mg/dL 0.81   GLUCOSE mg/dL 103*   CALCIUM mg/dL 9.5   WBC 10*3/mm3 7.02   HEMOGLOBIN g/dL 12.4   PLATELETS 10*3/mm3 205   ALT (SGPT) U/L 7   AST (SGOT) U/L 12     No results found for: CKTOTAL, CKMB, CKMBINDEX, TROPONINI, TROPONINT                  Results from last 7 days   Lab Units 04/26/23 1956   ADENOVIRUS DETECTION BY PCR  Not Detected   CORONAVIRUS 229E  Not Detected   CORONAVIRUS HKU1  Not Detected   CORONAVIRUS NL63  Not Detected   CORONAVIRUS OC43  Not Detected   HUMAN METAPNEUMOVIRUS  Not Detected   HUMAN RHINOVIRUS/ENTEROVIRUS  Not Detected   INFLUENZA B PCR  Not Detected   PARAINFLUENZA 1  Not Detected   PARAINFLUENZA VIRUS 2  Not Detected   PARAINFLUENZA VIRUS 3  Not Detected   PARAINFLUENZA VIRUS 4  Not Detected   BORDETELLA PERTUSSIS PCR  Not Detected   BORDETELLA PARAPERTUSSIS PCR  Not Detected   CHLAMYDOPHILA PNEUMONIAE PCR  Not Detected   MYCOPLAMA PNEUMO PCR  Not Detected   RSV, PCR  Not Detected             No results found for: TSH  Estimated Creatinine Clearance: 74.8 mL/min (by C-G formula based on SCr of 0.81 mg/dL).  Results from last 7 days   Lab  Units 04/26/23  1923   NITRITE UA  Negative        Imaging: I personally visualized the images of scans/x-rays performed within last 3 days.      Assessment:  Blurred vision  Left ICA terminus aneurysm status post cerebral embolization  History of tobacco abuse    Recommendations:  Neurochecks postembolization per protocol every 1 hour  ICU core measures  Blood pressure management keep systolic less than 140  Advised patient to quit smoking long-term  Watch closely in the ICU overnight.   Discussed with Dr. Carbajal at bedside.          Patricia Tovar MD  4/28/2023  18:21 EDT      Much of this encounter note is an electronic transcription/translation of spoken language to printed text using Dragon Software.

## 2023-04-28 NOTE — ANESTHESIA PROCEDURE NOTES
Arterial Line      Patient reassessed immediately prior to procedure    Patient location during procedure: pre-op  Start time: 4/28/2023 1:14 PM  Stop Time:4/28/2023 1:18 PM       Line placed for hemodynamic monitoring.  Performed By   Anesthesiologist: Dany Coe MD   Preanesthetic Checklist  Completed: patient identified and risks and benefits discussed  Arterial Line Prep    Sterile Tech: gloves  Prep: ChloraPrep  Patient monitoring: blood pressure monitoring, continuous pulse oximetry and EKG  Arterial Line Procedure   Laterality:right  Location:  radial artery  Catheter size: 20 G   Guidance: ultrasound guided  PROCEDURE NOTE/ULTRASOUND INTERPRETATION.  Using ultrasound guidance the potential vascular sites for insertion of the catheter were visualized to determine the patency of the vessel to be used for vascular access.  After selecting the appropriate site for insertion, the needle was visualized under ultrasound being inserted into the radial artery, followed by ultrasound confirmation of wire and catheter placement. There were no abnormalities seen on ultrasound; an image was taken; and the patient tolerated the procedure with no complications.   Successful placement: yes Images: still images obtained, printed/placed on the chart  Post Assessment   Dressing Type: occlusive dressing applied and secured with tape.   Complications no   Patient Tolerance: patient tolerated the procedure well with no apparent complications  Additional Notes  Using ultrasound guidance the potential vascular sites for insertion of the catheter were visualized to determine the patency of the vessel to be used for vascular access.  After selecting the appropriate site for insertion, the needle was visualized under ultrasound being inserted into the radial artery, followed by ultrasound confirmation of wire and catheter placement.  There were no abnormalities seen on ultrasound; an image was taken/ and the patient tolerated the  procedure with no complications.

## 2023-04-28 NOTE — PROGRESS NOTES
Name: Oliva Flores ADMIT: 2023   : 1980  PCP: Corbin Dc III, MD    MRN: [id number] LOS: 1 days   AGE/SEX: 43 y.o. female  ROOM: [facility]     Subjective   Subjective   CT angiogram of the head showed a large left distal ICA saccular aneurysm.  Neurosurgery was consulted.  She will undergo a cerebral angiogram today.  Continues to endorse some lightheadedness.       Objective   Objective   Vital Signs  Temp:  [98 °F (36.7 °C)-98.9 °F (37.2 °C)] 98.6 °F (37 °C)  Heart Rate:  [59-99] 99  Resp:  [16-20] 16  BP: ()/(51-65) 90/61  SpO2:  [96 %-100 %] 98 %  on   ;   Device (Oxygen Therapy): room air  Body mass index is 19.83 kg/m².  Physical Exam  Constitutional:       Appearance: Normal appearance.   HENT:      Head: Normocephalic and atraumatic.   Cardiovascular:      Rate and Rhythm: Normal rate and regular rhythm.   Pulmonary:      Effort: Pulmonary effort is normal. No respiratory distress.   Abdominal:      General: There is no distension.      Palpations: Abdomen is soft.      Tenderness: There is no abdominal tenderness.   Neurological:      General: No focal deficit present.      Mental Status: She is alert and oriented to person, place, and time.         Results Review     I reviewed the patient's new clinical results.  Results from last 7 days   Lab Units 23   WBC 10*3/mm3 7.02   HEMOGLOBIN g/dL 12.4   PLATELETS 10*3/mm3 205     Results from last 7 days   Lab Units 23   SODIUM mmol/L 138   POTASSIUM mmol/L 4.1   CHLORIDE mmol/L 106   CO2 mmol/L 22.9   BUN mg/dL 14   CREATININE mg/dL 0.81   GLUCOSE mg/dL 103*   Estimated Creatinine Clearance: 74.8 mL/min (by C-G formula based on SCr of 0.81 mg/dL).  Results from last 7 days   Lab Units 23   ALBUMIN g/dL 4.5   BILIRUBIN mg/dL <0.2   ALK PHOS U/L 37*   AST (SGOT) U/L 12   ALT (SGPT) U/L 7     Results from last 7 days   Lab Units 04/26/23  1922   CALCIUM mg/dL 9.5   ALBUMIN g/dL 4.5        COVID19   Date Value Ref Range Status   04/26/2023 Not Detected Not Detected - Ref. Range Final     Hemoglobin A1C   Date/Time Value Ref Range Status   04/27/2023 0449 5.20 4.80 - 5.60 % Final     Glucose   Date/Time Value Ref Range Status   04/28/2023 1122 89 70 - 130 mg/dL Final     Comment:     Meter: [id number] : [id number] Olga Batemantlin NA   04/28/2023 0827 94 70 - 130 mg/dL Final     Comment:     Meter: [id number] : [id number] Chreticornelio Yasmeen NA   04/27/2023 2047 98 70 - 130 mg/dL Final     Comment:     Meter: [id number] : [id number] Silvana Monreal NA       CT Angiogram Carotids, CT Angiogram Head  Narrative: STAT CONTRAST-ENHANCED CT ANGIOGRAM OF THE HEAD AND NECK ON 04/27/2022     CLINICAL HISTORY: Patient complained of blurred vision and some vision  loss for 3 days     TECHNIQUE: Spiral CT images were obtained from the base of the skull to  the vertex both pre and postintravenous contrast and these images were  reformatted and submitted in 3 mm thick axial, sagittal and coronal CT  sections with brain algorithm. Additional spiral CT angioma images were  obtained from the top of the aortic arch up through the great vessels of  the head and neck during the arterial phase of contrast and these images  were reformatted and are submitted in 1 mm thick axial, sagittal and  coronal CT sections with soft tissue algorithm and 3-D reconstructions  were performed to complete the CT angiogram of the head and neck.     COMPARISON: This study is correlated to a prior MRI of the brain earlier  today, 04/27/2023 at 12:16 AM, and a prior noncontrast head CT yesterday  evening 04/26/2023 at 8:00 PM.     FINDINGS: The brain parenchyma is normal in attenuation. The ventricles  are normal in size. I see no focal mass effect. There is no midline  shift. No extra axial fluid collections are identified. There is no  evidence of acute intracranial cranial hemorrhage. Following contrast  there is evidence of an  aneurysm projecting superiorly off the left  internal carotid terminus that measures 8 x 6 x 6 mm. Otherwise no  abnormal areas of enhancement are seen in the head. The calvarium and  the skull base are normal in appearance. The paranasal sinuses and the  mastoid air cells and middle ear cavities are clear. The orbits are  unremarkable.     CT ANGIOGRAM OF THE NECK: The nasopharynx, oropharynx, hypopharynx, true  cords and subglottic airway are within normal limits. There is a  hypodense 5 mm nodule in the superior right lobe of thyroid likely a  tiny colloid cyst, otherwise the thyroid gland is unremarkable. The lung  apices are clear. The parotid, , parapharyngeal and  submandibular spaces are symmetric and are normal in appearance. There  is mild cervical spondylosis centered at C5-6 where there is some disc  space narrowing and mild degenerative endplate changes and mild  posterior spurring, mild canal narrowing, some uncovertebral joint  hypertrophy with uncovertebral joint spurs resulting in mild-to-moderate  left and moderate right bony foraminal narrowing at C5-6, otherwise the  cervical spine is unremarkable. There is direct origin of the left  vertebral artery off the aortic arch which is a normal anatomic  variation. The left vertebral origin is normal in appearance and the  left vertebral artery is widely patent from its origin to the  vertebrobasilar junction without stenosis. The left subclavian artery  origin is normal in appearance and no stenosis is seen in the left  subclavian artery. The left common carotid origin and proximal left  common carotid artery is obscured by beam hardening artifact off a  densely opacified left brachiocephalic vein. The mid and distal left  common carotid artery and left common carotid bifurcation is within  normal limits and no stenosis is seen in the cervical segment of the  left internal carotid artery using the NASCET criteria. The  brachiocephalic artery  origin is normal in appearance and no stenosis  seen in the brachiocephalic artery and its bifurcation in the right  subclavian and common carotid artery is normal in appearance and no  stenosis seen in the right subclavian artery. The right vertebral artery  origin is normal in appearance and no stenosis is seen in the right  vertebral artery from its origin to the vertebrobasilar junction. The  right common carotid origin is normal in appearance and no stenosis seen  in the right common carotid artery and its bifurcation into the right  internal and external carotid arteries is normal in appearance and no  stenosis is seen in the cervical segment of the right internal carotid  artery using the NASCET criteria.      CT ANGIOGRAM OF THE HEAD: The intracranial segments of the distal  vertebral arteries are widely patent to the vertebrobasilar junction  without stenosis. The basilar artery and the basilar tip is normal in  appearance. The posterior cerebral and superior cerebellar arteries are  within normal limits. The upper cervical, the petrous, the cavernous  segments of internal carotid arteries are normal in appearance  bilaterally. There is an aneurysm directed superior laterally off the  superior margin of the left internal carotid terminus, the aneurysm  maximally measures 9 x 7 x 6 mm in medial lateral, anterior posterior  and craniocaudal dimension. The aneurysm has a relatively narrow neck  measuring 3.5 x 3.5 mm. The A1 segments of the anterior cerebral  arteries and anterior communicating artery origin and A2 branches of the  anterior cerebral arteries are normal in appearance. The M1 segments of  the middle cerebral arteries and the middle cerebral artery bifurcations  are within normal limits. There is a tiny 2 mm nodular protuberance off  the posterior wall of the supracavernous right internal carotid artery  that is probably an infundibulum.      Impression: 1. The head CT demonstrates no convincing  acute intracranial  abnormality.  2. Mild cervical spondylosis is present with some disc space narrowing  and degenerative endplate changes at C5-6 with posterior spurs  contributing to mild canal narrowing and there are uncovertebral joint  spurs that contribute to mild-to-moderate left and moderate right bony  foraminal narrowing at C5-6. Otherwise, the cervical spine is  unremarkable.  3. The CT angiographic evaluation of the great vessels of the neck is  normal with no stenosis seen in the great vessels in the neck. In  particular, no vertebral artery stenosis is seen and no stenosis is seen  in the cervical segments of internal carotid arteries using the NASCET  criteria.  4. CT angiographic evaluation of the head demonstrates an aneurysm  projected anterior, superior laterally off of the the superior tip of  the left internal carotid terminus. The aneurysm maximally measures 9 x  7 x 6 mm in medial lateral and anterior posterior and craniocaudal  dimension and has a relatively narrow neck at its attachment to the  superior tip of the left internal carotid terminus with the neck  measuring 3.5 x 3.5 mm. Endovascular neurosurgical consultation is  warranted.  5. The remainder of the CT angiogram of the head and neck is within  normal limits and the etiology of the visual changes is not clearly  established on this exam, correlate clinically. The results were  communicated to Kaitlin Zapata, the physician assistant in the  [facility] observation unit taking care of the patient, by telephone on  04/27/2023 at 2:00 PM. I also communicated to Dr. Lepe from Stroke  Neurology by telephone on 04/27/2023 at 2:05 PM.     Radiation dose reduction techniques were utilized, including automated  exposure control and exposure modulation based on body size.     This report was finalized on 4/27/2023 5:24 PM by Dr. Gautam Torres M.D.     MRI Brain Without Contrast  Narrative: Patient: DOM BALES  Time Out: 01:06  Exam(s):  MRI HEAD Without Contrast     EXAM:    MR Head Without Intravenous Contrast    CLINICAL HISTORY:     Reason for exam: Blurred vision.    TECHNIQUE:    Magnetic resonance images of the head brain without intravenous   contrast in multiple planes.  Mild motion artifact.    COMPARISON:     Noncontrast head CT done earlier.    FINDINGS:    Brain: No edema or acute infarct. No acute or chronic hemorrhage.  No   abnormal signal in the brain parenchyma, with attention to the visual   pathway.    Ventricles:   No hydrocephalus or midline shift.    Bones joints:   No calvarial lesions.    Soft tissues: No scalp hematoma.  No abnormality of the globes.    Sinuses: Clear.    Mastoid air cells:   No mastoid effusion.    IMPRESSION:       1.  No acute infarct, bleed, or acute intracranial abnormality.  2.  Normal exam.  Impression: Electronically signed by Mimi Ruiz M.D. 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       43 y.o. female admitted with Blurred vision.    Blurry vision  Neurology was consulted. Her symptoms seem consistent with presyncope.  Orthostatics negative. Check EKG.  May need may need outpatient cardiology follow-up    ICA aneurysm  NSG consulted. Will undergo cerebral angiogram today with intervention  sBP less [id number]  ICU after procedure       · SCDs for DVT prophylaxis.  · Full code.  · Discussed with patient and nursing staff.        Shaan Lepe MD  [facility]  04/28/23  11:32 EDT

## 2023-04-29 ENCOUNTER — APPOINTMENT (OUTPATIENT)
Dept: CT IMAGING | Facility: HOSPITAL | Age: 43
DRG: 27 | End: 2023-04-29

## 2023-04-29 VITALS
HEIGHT: 64 IN | TEMPERATURE: 97.5 F | WEIGHT: 115.6 LBS | OXYGEN SATURATION: 97 % | SYSTOLIC BLOOD PRESSURE: 103 MMHG | DIASTOLIC BLOOD PRESSURE: 63 MMHG | HEART RATE: 76 BPM | RESPIRATION RATE: 16 BRPM | BODY MASS INDEX: 19.74 KG/M2

## 2023-04-29 LAB
ANION GAP SERPL CALCULATED.3IONS-SCNC: 10 MMOL/L (ref 5–15)
BASOPHILS # BLD AUTO: 0.02 10*3/MM3 (ref 0–0.2)
BASOPHILS NFR BLD AUTO: 0.2 % (ref 0–1.5)
BUN SERPL-MCNC: 12 MG/DL (ref 6–20)
BUN/CREAT SERPL: 17.9 (ref 7–25)
CALCIUM SPEC-SCNC: 7.9 MG/DL (ref 8.6–10.5)
CHLORIDE SERPL-SCNC: 109 MMOL/L (ref 98–107)
CO2 SERPL-SCNC: 18 MMOL/L (ref 22–29)
CREAT SERPL-MCNC: 0.67 MG/DL (ref 0.57–1)
DEPRECATED RDW RBC AUTO: 40.3 FL (ref 37–54)
EGFRCR SERPLBLD CKD-EPI 2021: 112.1 ML/MIN/1.73
EOSINOPHIL # BLD AUTO: 0 10*3/MM3 (ref 0–0.4)
EOSINOPHIL NFR BLD AUTO: 0 % (ref 0.3–6.2)
ERYTHROCYTE [DISTWIDTH] IN BLOOD BY AUTOMATED COUNT: 11.8 % (ref 12.3–15.4)
GLUCOSE SERPL-MCNC: 103 MG/DL (ref 65–99)
HCT VFR BLD AUTO: 32.1 % (ref 34–46.6)
HGB BLD-MCNC: 11.1 G/DL (ref 12–15.9)
IMM GRANULOCYTES # BLD AUTO: 0.03 10*3/MM3 (ref 0–0.05)
IMM GRANULOCYTES NFR BLD AUTO: 0.3 % (ref 0–0.5)
LYMPHOCYTES # BLD AUTO: 1.36 10*3/MM3 (ref 0.7–3.1)
LYMPHOCYTES NFR BLD AUTO: 15.9 % (ref 19.6–45.3)
MCH RBC QN AUTO: 32.2 PG (ref 26.6–33)
MCHC RBC AUTO-ENTMCNC: 34.6 G/DL (ref 31.5–35.7)
MCV RBC AUTO: 93 FL (ref 79–97)
MONOCYTES # BLD AUTO: 0.61 10*3/MM3 (ref 0.1–0.9)
MONOCYTES NFR BLD AUTO: 7.1 % (ref 5–12)
NEUTROPHILS NFR BLD AUTO: 6.56 10*3/MM3 (ref 1.7–7)
NEUTROPHILS NFR BLD AUTO: 76.5 % (ref 42.7–76)
NRBC BLD AUTO-RTO: 0 /100 WBC (ref 0–0.2)
PLATELET # BLD AUTO: 196 10*3/MM3 (ref 140–450)
PMV BLD AUTO: 10.9 FL (ref 6–12)
POTASSIUM SERPL-SCNC: 3.8 MMOL/L (ref 3.5–5.2)
RBC # BLD AUTO: 3.45 10*6/MM3 (ref 3.77–5.28)
SODIUM SERPL-SCNC: 137 MMOL/L (ref 136–145)
WBC NRBC COR # BLD: 8.58 10*3/MM3 (ref 3.4–10.8)

## 2023-04-29 PROCEDURE — 80048 BASIC METABOLIC PNL TOTAL CA: CPT | Performed by: RADIOLOGY

## 2023-04-29 PROCEDURE — 99231 SBSQ HOSP IP/OBS SF/LOW 25: CPT | Performed by: NEUROLOGICAL SURGERY

## 2023-04-29 PROCEDURE — 85025 COMPLETE CBC W/AUTO DIFF WBC: CPT | Performed by: RADIOLOGY

## 2023-04-29 PROCEDURE — 70450 CT HEAD/BRAIN W/O DYE: CPT

## 2023-04-29 RX ORDER — ASPIRIN 81 MG/1
81 TABLET, CHEWABLE ORAL DAILY
Qty: 30 TABLET | Refills: 0 | Status: SHIPPED | OUTPATIENT
Start: 2023-04-30

## 2023-04-29 RX ADMIN — Medication 3 ML: at 09:08

## 2023-04-29 RX ADMIN — Medication 10 ML: at 09:08

## 2023-04-29 RX ADMIN — HYDROXYZINE HYDROCHLORIDE 25 MG: 25 TABLET, FILM COATED ORAL at 02:56

## 2023-04-29 RX ADMIN — ASPIRIN 81 MG: 81 TABLET, CHEWABLE ORAL at 09:08

## 2023-04-29 RX ADMIN — SODIUM CHLORIDE 1000 ML: 9 INJECTION, SOLUTION INTRAVENOUS at 00:38

## 2023-04-29 NOTE — PLAN OF CARE
Goal Outcome Evaluation:         Pt assessment per flowsheet. Medications per MAR. Pt BP soft throughout shift, pt given 1L of fluids over 4 hrs per Liz Mayes to attempt to rise pressure. Pt experienced some anxiety & requested some medication to help. APRN rx hydroxizine PRN TID. Am CT completed,( see CT results).

## 2023-04-29 NOTE — PROGRESS NOTES
POD 1  Covering for Dr. Lopez  S/p endovascular webbing of left ICA aneurysm  Vitals:    04/29/23 0900 04/29/23 0915 04/29/23 0930 04/29/23 0945   BP: 101/62 96/60 97/57 90/52   Pulse: 95 93 92 97   Resp:       Temp:       TempSrc:       SpO2: 96% 96% 98% 95%   Weight:       Height:       doing well  Groin ok  PERRLA  MURPHY  Vision normal to confrontation  On baby ASA  CT this morning fine  OK to d/c  Office will arrange f/u in about two weeks  CT OF THE HEAD WITHOUT CONTRAST     HISTORY: Left internal carotid artery aneurysm embolization     COMPARISON: 04/27/2023     TECHNIQUE: Axial CT imaging was obtained through the brain. No IV  contrast was administered.     FINDINGS:  Since prior examination, the patient's previously identified distal left  internal carotid artery aneurysm has been embolized. No acute  intracranial hemorrhage is seen. No definite focal areas of decreased  attenuation are seen, although MRI would be more sensitive for  assessment. Ventricles are normal in size. There is no midline shift or  mass effect. Paranasal sinuses and mastoid air cells are clear     IMPRESSION:  Postoperative findings, as noted above.     Radiation dose reduction techniques were utilized, including automated  exposure control and exposure modulation based on body size.     This report was finalized on 4/29/2023 5:53 AM by Dr. Amena Charles M.D.     Lab Results   Component Value Date    GLUCOSE 103 (H) 04/29/2023    BUN 12 04/29/2023    CREATININE 0.67 04/29/2023    EGFR 112.1 04/29/2023    BCR 17.9 04/29/2023    K 3.8 04/29/2023    CO2 18.0 (L) 04/29/2023    CALCIUM 7.9 (L) 04/29/2023    ALBUMIN 4.5 04/26/2023    BILITOT <0.2 04/26/2023    AST 12 04/26/2023    ALT 7 04/26/2023     WBC   Date Value Ref Range Status   04/29/2023 8.58 3.40 - 10.80 10*3/mm3 Final     RBC   Date Value Ref Range Status   04/29/2023 3.45 (L) 3.77 - 5.28 10*6/mm3 Final     Hemoglobin   Date Value Ref Range Status   04/29/2023 11.1 (L)  12.0 - 15.9 g/dL Final     Hematocrit   Date Value Ref Range Status   04/29/2023 32.1 (L) 34.0 - 46.6 % Final     MCV   Date Value Ref Range Status   04/29/2023 93.0 79.0 - 97.0 fL Final     MCH   Date Value Ref Range Status   04/29/2023 32.2 26.6 - 33.0 pg Final     MCHC   Date Value Ref Range Status   04/29/2023 34.6 31.5 - 35.7 g/dL Final     RDW   Date Value Ref Range Status   04/29/2023 11.8 (L) 12.3 - 15.4 % Final     RDW-SD   Date Value Ref Range Status   04/29/2023 40.3 37.0 - 54.0 fl Final     MPV   Date Value Ref Range Status   04/29/2023 10.9 6.0 - 12.0 fL Final     Platelets   Date Value Ref Range Status   04/29/2023 196 140 - 450 10*3/mm3 Final     Neutrophil %   Date Value Ref Range Status   04/29/2023 76.5 (H) 42.7 - 76.0 % Final     Lymphocyte %   Date Value Ref Range Status   04/29/2023 15.9 (L) 19.6 - 45.3 % Final     Monocyte %   Date Value Ref Range Status   04/29/2023 7.1 5.0 - 12.0 % Final     Eosinophil %   Date Value Ref Range Status   04/29/2023 0.0 (L) 0.3 - 6.2 % Final     Basophil %   Date Value Ref Range Status   04/29/2023 0.2 0.0 - 1.5 % Final     Immature Grans %   Date Value Ref Range Status   04/29/2023 0.3 0.0 - 0.5 % Final     Neutrophils, Absolute   Date Value Ref Range Status   04/29/2023 6.56 1.70 - 7.00 10*3/mm3 Final     Lymphocytes, Absolute   Date Value Ref Range Status   04/29/2023 1.36 0.70 - 3.10 10*3/mm3 Final     Monocytes, Absolute   Date Value Ref Range Status   04/29/2023 0.61 0.10 - 0.90 10*3/mm3 Final     Eosinophils, Absolute   Date Value Ref Range Status   04/29/2023 0.00 0.00 - 0.40 10*3/mm3 Final     Basophils, Absolute   Date Value Ref Range Status   04/29/2023 0.02 0.00 - 0.20 10*3/mm3 Final     Immature Grans, Absolute   Date Value Ref Range Status   04/29/2023 0.03 0.00 - 0.05 10*3/mm3 Final     nRBC   Date Value Ref Range Status   04/29/2023 0.0 0.0 - 0.2 /100 WBC Final

## 2023-04-29 NOTE — DISCHARGE SUMMARY
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        Hospital Course     This is a 41-year-old woman with no significant past medical history who came to the hospital with blurry vision that had been occurring intermittently for 3 days prior to presentation.  Also endorsed some presyncopal features with lightheadedness when she went from sitting to a standing position.  Orthostatic blood pressures were negative.  Imaging was negative for CVA but an angiogram did reveal an angiogram of the left internal carotid artery.  She underwent IR embolization on  and was cleared by neurosurgery for discharge.  She will be discharged on baby aspirin.  Due to the presyncopal symptoms that she experienced upon presentation she will be discharged with a Holter monitor and left to follow-up with cardiology in the outpatient setting for further evaluation and management.    Day of Discharge       Physical Exam:  Temp:  [97.5 °F (36.4 °C)-99.1 °F (37.3 °C)] 97.5 °F (36.4 °C)  Heart Rate:  [] 94  Resp:  [16] 16  BP: ()/(52-67) 90/52  Body mass index is 19.83 kg/m².  Physical Exam  Constitutional:       Appearance: Normal appearance.   HENT:      Head: Normocephalic and atraumatic.   Cardiovascular:      Rate and Rhythm: Normal rate and regular rhythm.   Pulmonary:      Effort: Pulmonary effort is normal. No respiratory distress.   Abdominal:      General: There is no distension.      Palpations: Abdomen is soft.      Tenderness: There is no abdominal tenderness.   Neurological:      General: No focal deficit present.       Mental Status: She is alert and oriented to person, place, and time.         Consultants     Consult Orders (all) (From admission, onward)     Start     Ordered    04/28/23 1644  Inpatient Intensivist Consult  Once        Specialty:  Pulmonary Disease  Provider:  Patricia Tovar MD    04/28/23 1644    04/27/23 1417  Inpatient Internal Medicine Consult  Once        Specialty:  Hospitalist  Provider:  Starla Beltran MD    04/27/23 1416    04/27/23 1403  Inpatient Neurosurgery Consult  Once        Specialty:  Neurosurgery  Provider:  Zachary Boyd IV, MD    04/27/23 1403    04/27/23 0630  Inpatient Neurology Consult Stroke  Once        Specialty:  Neurology  Provider:  Satnam Lepe MD    04/27/23 0629    Pending  Inpatient Intensivist Consult  Once        Specialty:  Intensive Care  Provider:  (Not yet assigned)    Pending              Procedures     Imaging Results (All)     Procedure Component Value Units Date/Time    CT Head Without Contrast [[id number]] Collected: 04/29/23 0549     Updated: 04/29/23 0556    Narrative:      CT OF THE HEAD WITHOUT CONTRAST     HISTORY: Left internal carotid artery aneurysm embolization     COMPARISON: 04/27/2023     TECHNIQUE: Axial CT imaging was obtained through the brain. No IV  contrast was administered.     FINDINGS:  Since prior examination, the patient's previously identified distal left  internal carotid artery aneurysm has been embolized. No acute  intracranial hemorrhage is seen. No definite focal areas of decreased  attenuation are seen, although MRI would be more sensitive for  assessment. Ventricles are normal in size. There is no midline shift or  mass effect. Paranasal sinuses and mastoid air cells are clear       Impression:      Postoperative findings, as noted above.     Radiation dose reduction techniques were utilized, including automated  exposure control and exposure modulation based on body size.     This report was finalized on 4/29/2023 5:53 AM by  Dr. Amena Charles M.D.       IR Embolization [[id number]] Collected: 04/28/23 1724     Updated: 04/28/23 1934    Narrative:      CEREBRAL ARTERIOGRAM WITH INTRACRANIAL EMBOLIZATION performed on  04/28/2023.     CLINICAL HISTORY: 43-year-old female with history of tobacco abuse who  was having lightheadedness and underwent noninvasive imaging. She was  found to have an intracranial aneurysm. Given the aneurysm size and  irregularity, endovascular treatment was recommended.     TECHNIQUE: After obtaining informed consent, the patient was placed in a  supine position on the angiographic table and the right groin was then  prepped and draped in usual sterile fashion with ChloraPrep soap. Under  ultrasound guidance with permanent images recorded, a 4 Pakistani  micro-puncture set was used to access the right common femoral artery  through a dermatotomy. Through this access an 8 Pakistani sheath was  positioned within the right groin. Over an 035 Glidewire, a 5 Pakistani  vertebral artery catheter was placed into the circulation to select the  following vessels:     1. Right internal carotid artery with digital subtraction imaging of the  intracranial runoff after contrast injection.  2. Right vertebral artery with digital subtraction imaging of the  cervical and intracranial runoff after contrast injection.  3. Left internal carotid artery with digital subtraction imaging of the  intracranial runoff after contrast injection including a rotational  angiogram with 3-D reconstruction at a separate workstation.  4. Left vertebral artery with digital subtraction imaging of the  cervical and intracranial runoff after contrast injection.     Upon completion of the diagnostic portion of the exam, the endovascular  procedure was performed as described below. At the conclusion of the  entire procedure the catheters and sheath were removed with placement of  an 8 Pakistani Angio-Seal groin closure device. Once hemostasis was  achieved the  appropriate dressing was applied. No immediate  postprocedural complications were encountered. General endotracheal  anesthesia was provided by the anesthesia department throughout the  case. Approximately 33.5 minutes of fluoroscopy time were required  delivering an AK of 1862.90 mGy. 193 cc of Visipaque 320 were employed  for the procedure. Maximum sterile barrier technique was performed  throughout the entire procedure according to current guidelines.     Surgeons: Dr. Jeremias Lopez M.D.     FINDINGS: The right internal carotid artery injection demonstrates a  normal caliber and course in the cervical and intracranial ICA with no  origin atherosclerotic plaque and no significant stenosis. The ICA  origin occurs at the C3-4 level. The intracranial runoff shows a patent  A1 and M1 segment with a normal SHARMILA and MCA runoff. There is a very  small posterior communicating artery seen. The anterior communicating  artery is demonstrated transiently. The ophthalmic artery is patent with  a normal choroidal blush identified. No aneurysms, hypervascular lesions  or evidence of AV shunting is seen. There is venous drainage is  symmetric bilaterally in the dural sinuses.     The right vertebral artery injection demonstrates a widely patent right  vertebral artery with a non-dominant caliber and normal course in the  cervical and intracranial segments. Vertebrobasilar junction is patent  and demonstrates inflow washout from the left and the basilar artery is  normal in caliber. The posterior inferior cerebellar artery is absent  with a patent and duplicated anterior inferior cerebellar artery  supplying both territories. The basilar artery terminates into a normal  P1 and P2 segment bilaterally as well as single bilateral superior  cerebellar arteries. No aneurysms, hypervascular lesions or evidence of  AV shunting is seen.     The left vertebral artery injection demonstrates a dominant caliber and  normal course in the  cervical and intracranial segments. The left  vertebrobasilar junction is widely patent. The basilar artery is normal  in caliber and terminates into normal P1 and P2 segment bilaterally.  Single bilateral superior cerebellar arteries are present. A dominant  posterior inferior cerebellar artery is seen with a low origin plus a  nondominant anterior-inferior cerebellar artery is present. No  aneurysms, hypervascular lesions or evidence of AV shunting is seen.     The left internal carotid artery injection demonstrates a patent  bifurcation at the C3-4 level with no atherosclerotic irregularity and  no significant stenosis by NASCET criteria. The intracranial ICA is  normal in caliber and the A1 and M1 segments are patent and normal in  caliber. The MCA and SHARMILA runoffs are normal in their arterial, capillary  and venous phases except for the saccular aneurysm at the ICA terminus.  There is a saccular aneurysm with a moderately broad neck projecting  moderately lateral, superiorly and slightly anteriorly in location. The  aneurysm measures 7.1 x 6.5 x 8.7 mm and shows irregularity and daughter  blebs involving the dome. No additional aneurysms are seen. A patent  small sized anterior communicating artery is present. There is a patent  very small posterior communicating artery appreciated. The venous  drainage shows symmetric bilateral outflow in the dural sinuses.     ENDOVASCULAR PROCEDURE:     Left ICA Terminus Aneurysm WEB Embolization- Upon completion of the  diagnostic portion of the exam, the 5 Hebrew vertebral artery catheter  was exchanged over an exchange length Amplatz guidewire for a 6 Hebrew  Cerebase guiding sheath which was positioned in the distal cervical left  ICA. A combination of a 5 Hebrew Drea EX intermediate catheter and VIA  17 microcatheter were coaxially placed into the circulation over an  Adonis 14 microguidewire. Under roadmap conditions, the intermediate  catheter was positioned in  the supraclinoid left ICA segment where the  VIA 17 microcatheter was positioned in the ICA terminus aneurysm with a  superselective angiogram confirming its tip position. A 7 mm x 4 mm WEB  SL device was then positioned in the aneurysm, but not detached and  multiple follow-up angiograms were obtained confirming good wall  apposition and neck coverage. The WEB device was then detached without  difficulty and stagnation of flow within the aneurysm was already  identified. Final base catheter angiography was performed showing no  thromboembolic complications or guide catheter trauma to the cervical  ICA. The catheters and sheath were removed as described above with the  groin closure device placed. Patient was discharged to the recovery room  in stable condition. Follow-up angiography is recommended in 6 months to  assess for any aneurysm regrowth. IV heparin was administered in bolus  doses to maintain an ACT of around 250 seconds during the procedure.  Patient also was pretreated with Plavix and aspirin in case of needing a  stent.       Impression:      1. Left ICA terminus aneurysm as described above with successful  endovascular treatment using the WEB intra-saccular device. Follow-up  angiography in 6 months.  2. No significant stenosis or narrowing within the cerebrovascular  circulation.  3. No additional aneurysms, hypervascular lesions or evidence of AV  shunting.     All carotid stenosis measurements were made using NASCET criteria.     This report was finalized on 4/28/2023 6:08 PM by Dr. Jeremias Lopez M.D.       CT Angiogram Carotids [[id number]] Collected: 04/27/23 1438     Updated: 04/27/23 1727    Narrative:      STAT CONTRAST-ENHANCED CT ANGIOGRAM OF THE HEAD AND NECK ON 04/27/2022     CLINICAL HISTORY: Patient complained of blurred vision and some vision  loss for 3 days     TECHNIQUE: Spiral CT images were obtained from the base of the skull to  the vertex both pre and postintravenous contrast  and these images were  reformatted and submitted in 3 mm thick axial, sagittal and coronal CT  sections with brain algorithm. Additional spiral CT angioma images were  obtained from the top of the aortic arch up through the great vessels of  the head and neck during the arterial phase of contrast and these images  were reformatted and are submitted in 1 mm thick axial, sagittal and  coronal CT sections with soft tissue algorithm and 3-D reconstructions  were performed to complete the CT angiogram of the head and neck.     COMPARISON: This study is correlated to a prior MRI of the brain earlier  today, 04/27/2023 at 12:16 AM, and a prior noncontrast head CT yesterday  evening 04/26/2023 at 8:00 PM.     FINDINGS: The brain parenchyma is normal in attenuation. The ventricles  are normal in size. I see no focal mass effect. There is no midline  shift. No extra axial fluid collections are identified. There is no  evidence of acute intracranial cranial hemorrhage. Following contrast  there is evidence of an aneurysm projecting superiorly off the left  internal carotid terminus that measures 8 x 6 x 6 mm. Otherwise no  abnormal areas of enhancement are seen in the head. The calvarium and  the skull base are normal in appearance. The paranasal sinuses and the  mastoid air cells and middle ear cavities are clear. The orbits are  unremarkable.     CT ANGIOGRAM OF THE NECK: The nasopharynx, oropharynx, hypopharynx, true  cords and subglottic airway are within normal limits. There is a  hypodense 5 mm nodule in the superior right lobe of thyroid likely a  tiny colloid cyst, otherwise the thyroid gland is unremarkable. The lung  apices are clear. The parotid, , parapharyngeal and  submandibular spaces are symmetric and are normal in appearance. There  is mild cervical spondylosis centered at C5-6 where there is some disc  space narrowing and mild degenerative endplate changes and mild  posterior spurring, mild canal  narrowing, some uncovertebral joint  hypertrophy with uncovertebral joint spurs resulting in mild-to-moderate  left and moderate right bony foraminal narrowing at C5-6, otherwise the  cervical spine is unremarkable. There is direct origin of the left  vertebral artery off the aortic arch which is a normal anatomic  variation. The left vertebral origin is normal in appearance and the  left vertebral artery is widely patent from its origin to the  vertebrobasilar junction without stenosis. The left subclavian artery  origin is normal in appearance and no stenosis is seen in the left  subclavian artery. The left common carotid origin and proximal left  common carotid artery is obscured by beam hardening artifact off a  densely opacified left brachiocephalic vein. The mid and distal left  common carotid artery and left common carotid bifurcation is within  normal limits and no stenosis is seen in the cervical segment of the  left internal carotid artery using the NASCET criteria. The  brachiocephalic artery origin is normal in appearance and no stenosis  seen in the brachiocephalic artery and its bifurcation in the right  subclavian and common carotid artery is normal in appearance and no  stenosis seen in the right subclavian artery. The right vertebral artery  origin is normal in appearance and no stenosis is seen in the right  vertebral artery from its origin to the vertebrobasilar junction. The  right common carotid origin is normal in appearance and no stenosis seen  in the right common carotid artery and its bifurcation into the right  internal and external carotid arteries is normal in appearance and no  stenosis is seen in the cervical segment of the right internal carotid  artery using the NASCET criteria.      CT ANGIOGRAM OF THE HEAD: The intracranial segments of the distal  vertebral arteries are widely patent to the vertebrobasilar junction  without stenosis. The basilar artery and the basilar tip is normal  in  appearance. The posterior cerebral and superior cerebellar arteries are  within normal limits. The upper cervical, the petrous, the cavernous  segments of internal carotid arteries are normal in appearance  bilaterally. There is an aneurysm directed superior laterally off the  superior margin of the left internal carotid terminus, the aneurysm  maximally measures 9 x 7 x 6 mm in medial lateral, anterior posterior  and craniocaudal dimension. The aneurysm has a relatively narrow neck  measuring 3.5 x 3.5 mm. The A1 segments of the anterior cerebral  arteries and anterior communicating artery origin and A2 branches of the  anterior cerebral arteries are normal in appearance. The M1 segments of  the middle cerebral arteries and the middle cerebral artery bifurcations  are within normal limits. There is a tiny 2 mm nodular protuberance off  the posterior wall of the supracavernous right internal carotid artery  that is probably an infundibulum.        Impression:      1. The head CT demonstrates no convincing acute intracranial  abnormality.  2. Mild cervical spondylosis is present with some disc space narrowing  and degenerative endplate changes at C5-6 with posterior spurs  contributing to mild canal narrowing and there are uncovertebral joint  spurs that contribute to mild-to-moderate left and moderate right bony  foraminal narrowing at C5-6. Otherwise, the cervical spine is  unremarkable.  3. The CT angiographic evaluation of the great vessels of the neck is  normal with no stenosis seen in the great vessels in the neck. In  particular, no vertebral artery stenosis is seen and no stenosis is seen  in the cervical segments of internal carotid arteries using the NASCET  criteria.  4. CT angiographic evaluation of the head demonstrates an aneurysm  projected anterior, superior laterally off of the the superior tip of  the left internal carotid terminus. The aneurysm maximally measures 9 x  7 x 6 mm in medial  lateral and anterior posterior and craniocaudal  dimension and has a relatively narrow neck at its attachment to the  superior tip of the left internal carotid terminus with the neck  measuring 3.5 x 3.5 mm. Endovascular neurosurgical consultation is  warranted.  5. The remainder of the CT angiogram of the head and neck is within  normal limits and the etiology of the visual changes is not clearly  established on this exam, correlate clinically. The results were  communicated to Kaitlin Zapata, the physician assistant in the  [facility] observation unit taking care of the patient, by telephone on  04/27/2023 at 2:00 PM. I also communicated to Dr. Lepe from Stroke  Neurology by telephone on 04/27/2023 at 2:05 PM.     Radiation dose reduction techniques were utilized, including automated  exposure control and exposure modulation based on body size.     This report was finalized on 4/27/2023 5:24 PM by Dr. Gautam Torres M.D.       CT Angiogram Head [[id number]] Collected: 04/27/23 1438     Updated: 04/27/23 1727    Narrative:      STAT CONTRAST-ENHANCED CT ANGIOGRAM OF THE HEAD AND NECK ON 04/27/2022     CLINICAL HISTORY: Patient complained of blurred vision and some vision  loss for 3 days     TECHNIQUE: Spiral CT images were obtained from the base of the skull to  the vertex both pre and postintravenous contrast and these images were  reformatted and submitted in 3 mm thick axial, sagittal and coronal CT  sections with brain algorithm. Additional spiral CT angioma images were  obtained from the top of the aortic arch up through the great vessels of  the head and neck during the arterial phase of contrast and these images  were reformatted and are submitted in 1 mm thick axial, sagittal and  coronal CT sections with soft tissue algorithm and 3-D reconstructions  were performed to complete the CT angiogram of the head and neck.     COMPARISON: This study is correlated to a prior MRI of the brain earlier  today,  04/27/2023 at 12:16 AM, and a prior noncontrast head CT yesterday  evening 04/26/2023 at 8:00 PM.     FINDINGS: The brain parenchyma is normal in attenuation. The ventricles  are normal in size. I see no focal mass effect. There is no midline  shift. No extra axial fluid collections are identified. There is no  evidence of acute intracranial cranial hemorrhage. Following contrast  there is evidence of an aneurysm projecting superiorly off the left  internal carotid terminus that measures 8 x 6 x 6 mm. Otherwise no  abnormal areas of enhancement are seen in the head. The calvarium and  the skull base are normal in appearance. The paranasal sinuses and the  mastoid air cells and middle ear cavities are clear. The orbits are  unremarkable.     CT ANGIOGRAM OF THE NECK: The nasopharynx, oropharynx, hypopharynx, true  cords and subglottic airway are within normal limits. There is a  hypodense 5 mm nodule in the superior right lobe of thyroid likely a  tiny colloid cyst, otherwise the thyroid gland is unremarkable. The lung  apices are clear. The parotid, , parapharyngeal and  submandibular spaces are symmetric and are normal in appearance. There  is mild cervical spondylosis centered at C5-6 where there is some disc  space narrowing and mild degenerative endplate changes and mild  posterior spurring, mild canal narrowing, some uncovertebral joint  hypertrophy with uncovertebral joint spurs resulting in mild-to-moderate  left and moderate right bony foraminal narrowing at C5-6, otherwise the  cervical spine is unremarkable. There is direct origin of the left  vertebral artery off the aortic arch which is a normal anatomic  variation. The left vertebral origin is normal in appearance and the  left vertebral artery is widely patent from its origin to the  vertebrobasilar junction without stenosis. The left subclavian artery  origin is normal in appearance and no stenosis is seen in the left  subclavian artery. The  left common carotid origin and proximal left  common carotid artery is obscured by beam hardening artifact off a  densely opacified left brachiocephalic vein. The mid and distal left  common carotid artery and left common carotid bifurcation is within  normal limits and no stenosis is seen in the cervical segment of the  left internal carotid artery using the NASCET criteria. The  brachiocephalic artery origin is normal in appearance and no stenosis  seen in the brachiocephalic artery and its bifurcation in the right  subclavian and common carotid artery is normal in appearance and no  stenosis seen in the right subclavian artery. The right vertebral artery  origin is normal in appearance and no stenosis is seen in the right  vertebral artery from its origin to the vertebrobasilar junction. The  right common carotid origin is normal in appearance and no stenosis seen  in the right common carotid artery and its bifurcation into the right  internal and external carotid arteries is normal in appearance and no  stenosis is seen in the cervical segment of the right internal carotid  artery using the NASCET criteria.      CT ANGIOGRAM OF THE HEAD: The intracranial segments of the distal  vertebral arteries are widely patent to the vertebrobasilar junction  without stenosis. The basilar artery and the basilar tip is normal in  appearance. The posterior cerebral and superior cerebellar arteries are  within normal limits. The upper cervical, the petrous, the cavernous  segments of internal carotid arteries are normal in appearance  bilaterally. There is an aneurysm directed superior laterally off the  superior margin of the left internal carotid terminus, the aneurysm  maximally measures 9 x 7 x 6 mm in medial lateral, anterior posterior  and craniocaudal dimension. The aneurysm has a relatively narrow neck  measuring 3.5 x 3.5 mm. The A1 segments of the anterior cerebral  arteries and anterior communicating artery origin  and A2 branches of the  anterior cerebral arteries are normal in appearance. The M1 segments of  the middle cerebral arteries and the middle cerebral artery bifurcations  are within normal limits. There is a tiny 2 mm nodular protuberance off  the posterior wall of the supracavernous right internal carotid artery  that is probably an infundibulum.        Impression:      1. The head CT demonstrates no convincing acute intracranial  abnormality.  2. Mild cervical spondylosis is present with some disc space narrowing  and degenerative endplate changes at C5-6 with posterior spurs  contributing to mild canal narrowing and there are uncovertebral joint  spurs that contribute to mild-to-moderate left and moderate right bony  foraminal narrowing at C5-6. Otherwise, the cervical spine is  unremarkable.  3. The CT angiographic evaluation of the great vessels of the neck is  normal with no stenosis seen in the great vessels in the neck. In  particular, no vertebral artery stenosis is seen and no stenosis is seen  in the cervical segments of internal carotid arteries using the NASCET  criteria.  4. CT angiographic evaluation of the head demonstrates an aneurysm  projected anterior, superior laterally off of the the superior tip of  the left internal carotid terminus. The aneurysm maximally measures 9 x  7 x 6 mm in medial lateral and anterior posterior and craniocaudal  dimension and has a relatively narrow neck at its attachment to the  superior tip of the left internal carotid terminus with the neck  measuring 3.5 x 3.5 mm. Endovascular neurosurgical consultation is  warranted.  5. The remainder of the CT angiogram of the head and neck is within  normal limits and the etiology of the visual changes is not clearly  established on this exam, correlate clinically. The results were  communicated to Kaitlin Zapata, the physician assistant in the  [facility] observation unit taking care of the patient, by telephone on  04/27/2023  at 2:00 PM. I also communicated to Dr. Lepe from Stroke  Neurology by telephone on 04/27/2023 at 2:05 PM.     Radiation dose reduction techniques were utilized, including automated  exposure control and exposure modulation based on body size.     This report was finalized on 4/27/2023 5:24 PM by Dr. Gautam Torres M.D.       MRI Brain Without Contrast [[id number]] Collected: 04/27/23 0107     Updated: 04/27/23 0107    Narrative:        Patient: DOM BALES  Time Out: 01:06  Exam(s): MRI HEAD Without Contrast     EXAM:    MR Head Without Intravenous Contrast    CLINICAL HISTORY:     Reason for exam: Blurred vision.    TECHNIQUE:    Magnetic resonance images of the head brain without intravenous   contrast in multiple planes.  Mild motion artifact.    COMPARISON:     Noncontrast head CT done earlier.    FINDINGS:    Brain: No edema or acute infarct. No acute or chronic hemorrhage.  No   abnormal signal in the brain parenchyma, with attention to the visual   pathway.    Ventricles:   No hydrocephalus or midline shift.    Bones joints:   No calvarial lesions.    Soft tissues: No scalp hematoma.  No abnormality of the globes.    Sinuses: Clear.    Mastoid air cells:   No mastoid effusion.    IMPRESSION:       1.  No acute infarct, bleed, or acute intracranial abnormality.  2.  Normal exam.      Impression:          Electronically signed by Mimi Ruiz M.D. on 04-27-23 at 0106    CT Head Without Contrast [[id number]] Collected: 04/26/23 2011     Updated: 04/26/23 2017    Narrative:      CT HEAD WO CONTRAST-     Radiation dose reduction techniques were utilized, including automated  exposure control and exposure modulation based on body size.     Clinical: 43-year-old female with blurred vision, lightheaded     CT findings: No intracranial hemorrhage, gross mass effect or edema.  There is good differentiation between the wording 3 matter. The  ventricles have a satisfactory appearance and the basilar cisterns  are  preserved. The posterior fossa contents within normal limits. Temporal  bones have a symmetric satisfactory appearance. Internal auditory canals  are normal. No acute osseous abnormality or bone lesion seen. The globes  are symmetric and satisfactory in position. No orbital mass. Visualized  paranasal sinuses within normal limits.     CONCLUSION: No acute intracranial abnormality.        This report was finalized on 4/26/2023 8:14 PM by Dr. Roberto Marie M.D.             Pertinent Labs     Results from last 7 days   Lab Units 04/29/23  0309 04/26/23  1922   WBC 10*3/mm3 8.58 7.02   HEMOGLOBIN g/dL 11.1* 12.4   PLATELETS 10*3/mm3 196 205     Results from last 7 days   Lab Units 04/29/23  0309 04/26/23  1922   SODIUM mmol/L 137 138   POTASSIUM mmol/L 3.8 4.1   CHLORIDE mmol/L 109* 106   CO2 mmol/L 18.0* 22.9   BUN mg/dL 12 14   CREATININE mg/dL 0.67 0.81   GLUCOSE mg/dL 103* 103*   Estimated Creatinine Clearance: 90.5 mL/min (by C-G formula based on SCr of 0.67 mg/dL).  Results from last 7 days   Lab Units 04/26/23  1922   ALBUMIN g/dL 4.5   BILIRUBIN mg/dL <0.2   ALK PHOS U/L 37*   AST (SGOT) U/L 12   ALT (SGPT) U/L 7     Results from last 7 days   Lab Units 04/29/23  0309 04/26/23  1922   CALCIUM mg/dL 7.9* 9.5   ALBUMIN g/dL  --  4.5           Results from last 7 days   Lab Units 04/27/23  0449   CHOLESTEROL mg/dL 130   TRIGLYCERIDES mg/dL 76   HDL CHOL mg/dL 56   LDL CHOL mg/dL 59           Test Results Pending at Discharge       Discharge Details        Discharge Medications      New Medications      Instructions Start Date   aspirin 81 MG chewable tablet   81 mg, Oral, Daily   Start Date: April 30, 2023            Allergies   Allergen Reactions   • Oxycodone Itching   • Phenergan [Promethazine Hcl] GI Intolerance         Discharge Disposition:  Home or Self Care    Discharge Diet:  Diet Order   Procedures   • Diet: Regular/House Diet; Texture: Regular Texture (IDDSI 7); Fluid Consistency: Thin (IDDSI 0)  "      Discharge Activity: as tolerated       CODE STATUS:    Code Status and Medical Interventions:   Ordered at: 04/28/23 1644     Level Of Support Discussed With:    Patient     Code Status (Patient has no pulse and is not breathing):    CPR (Attempt to Resuscitate)     Medical Interventions (Patient has pulse or is breathing):    Full Support     Release to patient:    Routine Release       No future appointments.   Follow-up Information     [facility] CARDIOLOGY Follow up.    Specialty: Cardiology  Why: Follow-up if symptoms persist.  Contact information:  Kelli Colten Patel  [facility] [address]  [facility] [address] [phone]  Additional information:  Phone Number: [phone]   On [facility], tall 6 story \"[address] building.  6th floor.  Elevator opens into office waiting room           Corbin Dc III, MD Follow up.    Specialty: Family Medicine  Contact information:  [address]  [facility] [address] [phone]             Zachary Pack Jr., MD .    Specialties: Cardiology, Interventional Cardiology  Contact information:  [address] [facility]  [address]  [place] [id number] [phone]                         Time Spent on Discharge:  Greater than 30 minutes      Shaan Lepe MD  [facility]  04/29/23  13:13 EDT              "

## 2023-04-30 ENCOUNTER — READMISSION MANAGEMENT (OUTPATIENT)
Dept: CALL CENTER | Facility: HOSPITAL | Age: 43
End: 2023-04-30

## 2023-04-30 NOTE — OUTREACH NOTE
Prep Survey    Flowsheet Row Responses   [facility] facility patient discharged from? [place]   Is LACE score < 7 ? 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 Other   Does the patient have Home health ordered? No   Is there a DME ordered? No   Prep survey completed? Yes          KUMAR SO - Registered Nurse

## 2023-05-01 ENCOUNTER — TELEPHONE (OUTPATIENT)
Dept: NEUROSURGERY | Facility: CLINIC | Age: 43
End: 2023-05-01

## 2023-05-01 LAB
ACT BLD: 155 SECONDS (ref 82–152)
ACT BLD: 155 SECONDS (ref 82–152)
ACT BLD: 269 SECONDS (ref 82–152)

## 2023-05-01 NOTE — TELEPHONE ENCOUNTER
Patient called stating she had a procedure done by Dr. Lopez on Friday 4/28. She would like to know specifically what type of procedure this was (could not find case request in system) and if she should be aware of any implants, devices, stents, etc. She also has been experiencing light headaches and short term memory issues since procedure. She would like to know what medicine she can take for this.   Please advise, best callback # is [phone].

## 2023-05-02 ENCOUNTER — TELEPHONE (OUTPATIENT)
Dept: NEUROSURGERY | Facility: CLINIC | Age: 43
End: 2023-05-02

## 2023-05-02 NOTE — TELEPHONE ENCOUNTER
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 Needs f/u visit in office about two weeks from now. Went home on baby asa.

## 2023-05-03 ENCOUNTER — READMISSION MANAGEMENT (OUTPATIENT)
Dept: CALL CENTER | Facility: HOSPITAL | Age: 43
End: 2023-05-03

## 2023-05-03 NOTE — OUTREACH NOTE
Medical Week 1 Survey    Flowsheet Row Responses   [facility] patient discharged from? [place]   Does the patient have one of the following disease processes/diagnoses(primary or secondary)? Other   Week 1 attempt successful? 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 Yes   Is the patient having any side effects they believe may be caused by any medication additions or changes? No   Does the patient have all medications ordered at discharge? Yes   Is the patient taking all medications as directed (includes completed medication regime)? Yes   Does the patient have a primary care provider?  Yes   Does the patient have an appointment with their PCP within 7 days of discharge? Yes   Has the patient kept scheduled appointments due by today? Yes   Comments Has neuro appt 5/16/23   Has home health visited the patient within 72 hours of discharge? N/A   Psychosocial issues? No   Did the patient receive a copy of their discharge instructions? Yes   Nursing interventions Reviewed instructions with patient   What is the patient's perception of their health status since discharge? Same  [slight HA that has progressed to getting worse]   Is the patient/caregiver able to teach back signs and symptoms related to disease process for when to call PCP? Yes   Is the patient/caregiver able to teach back signs and symptoms related to disease process for when to call 911? Yes   Is the patient/caregiver able to teach back the hierarchy of who to call/visit for symptoms/problems? PCP, Specialist, Home health nurse, Urgent Care, ED, 911 Yes   Week 1 call completed? Yes   Wrap up additional comments Pt reports that her HA has gotten worse and has a call into neuro. Advised Pt if any severe pain, vision change, numbness weakness or confusion to seek treatment right away.           JUDSON Browning  Registered Nurse

## 2023-05-04 NOTE — TELEPHONE ENCOUNTER
Tried to call patient back and LM that she has a follow up appointment with Dr. Lopez on 5/16 at 11:30. He can answer all of her questions regarding the procedure at that time.

## 2023-05-09 NOTE — PROGRESS NOTES
Subjective   Patient ID: Oliva Flores is a 43 y.o. female is here today for follow-up. She had left internal carotid artery aneurysm embolization done on 4/28/23.  She states she is extremely tired and having dizziness. She also has N/T in left arm.     History of Present Illness  43 y.o. female with no past medical history who presented to the ED after 3 episodes of total loss of vision lasting 2-10 minutes and lightheadedness, who is currently back to baseline. Imaging demonstrates a 10mm ICA aneurysm. She is a long term smoker. She does not have a family history of aneurysms.  She elected to undergo cerebral angiography with endovascular embolization which was performed on 4/28/2023.  She was subsequently placed on an aspirin and now returns for her postoperative visit.  She was seen in the emergency room for an episode of dizziness and ataxia that has since resolved.  She reports her groin puncture site has healed well with only the expected amount of bruising.  She reports no headaches, weakness or other neurologic deficit.      The following portions of the patient's history were reviewed and updated as appropriate:   She  has a past medical history of Abnormal Pap smear of cervix.  She does not have any pertinent problems on file.  She  has a past surgical history that includes Cervical biopsy w/ loop electrode excision.  Her family history is not on file.  She  reports that she has been smoking cigarettes. She has been smoking an average of .25 packs per day. She does not have any smokeless tobacco history on file. She reports current alcohol use. She reports that she does not use drugs.  Current Outpatient Medications   Medication Sig Dispense Refill   • aspirin 81 MG chewable tablet Chew 1 tablet Daily. 30 tablet 0   • meclizine (ANTIVERT) 25 MG tablet Take 1 tablet by mouth 3 (Three) Times a Day As Needed for Dizziness for up to 3 days. 9 tablet 0     No current facility-administered medications for  "this visit.     Current Outpatient Medications on File Prior to Visit   Medication Sig   • aspirin 81 MG chewable tablet Chew 1 tablet Daily.   • meclizine (ANTIVERT) 25 MG tablet Take 1 tablet by mouth 3 (Three) Times a Day As Needed for Dizziness for up to 3 days.     No current facility-administered medications on file prior to visit.     She is allergic to oxycodone and phenergan [promethazine hcl]..    Review of Systems   Eyes: Negative for visual disturbance.   Neurological: Positive for dizziness, light-headedness and numbness (Left arm N/T). Negative for headaches.       Objective     Vitals:    05/16/23 1146   BP: 112/68   Pulse: 52   Temp: 97.7 °F (36.5 °C)   SpO2: 99%   Weight: 55 kg (121 lb 3.2 oz)   Height: 165.1 cm (65\")     Body mass index is 20.17 kg/m².    Tobacco Use: High Risk   • Smoking Tobacco Use: Every Day   • Smokeless Tobacco Use: Unknown   • Passive Exposure: Not on file          Physical Exam  Vitals and nursing note reviewed.   Constitutional:       General: She is not in acute distress.     Appearance: Normal appearance. She is normal weight.   HENT:      Head: Normocephalic.      Nose: Nose normal.      Mouth/Throat:      Mouth: Mucous membranes are moist.   Eyes:      Extraocular Movements: Extraocular movements intact.      Conjunctiva/sclera: Conjunctivae normal.      Pupils: Pupils are equal, round, and reactive to light.   Cardiovascular:      Rate and Rhythm: Normal rate.   Pulmonary:      Effort: Pulmonary effort is normal.   Musculoskeletal:      Cervical back: Normal range of motion.        Legs:    Neurological:      Mental Status: She is alert.       Neurologic Exam     Cranial Nerves     CN III, IV, VI   Pupils are equal, round, and reactive to light.          Assessment & Plan   Independent Review of Radiographic Studies:      I personally reviewed the images from the following studies.    The cerebral angiogram dated 4/28/2023 was reviewed with the patient and shows " successful WEB embolization of the left ICA terminus aneurysm with a good result.  No additional aneurysms were identified and no stenosis, hypervascular lesion or evidence of AV shunting was appreciated.    Medical Decision Makin-year-old female with incidental finding of a left ICA terminus aneurysm that underwent successful embolization on 2023 with the WEB device.  Follow-up angiography will be performed in 1 year to confirm aneurysm occlusion.  He has no new neurologic complaints and has already ceased from smoking.  She continues on her aspirin which she can stop at 6 months post embolization.    Diagnoses and all orders for this visit:    1. Aneurysm of internal carotid artery (Primary)    2. Dizziness    3. Blurred vision      Return in about 1 year (around 2024) for Recheck, Cerebral Angiogram (DSA).

## 2023-05-10 ENCOUNTER — READMISSION MANAGEMENT (OUTPATIENT)
Dept: CALL CENTER | Facility: HOSPITAL | Age: 43
End: 2023-05-10

## 2023-05-10 NOTE — OUTREACH NOTE
Medical Week 2 Survey    Flowsheet Row Responses   [facility] patient discharged from? [place]   Does the patient have one of the following disease processes/diagnoses(primary or secondary)? Other   Week 2 attempt successful? No   Unsuccessful attempts Attempt 1          Amrita LAIRD - Licensed Nurse

## 2023-05-12 ENCOUNTER — APPOINTMENT (OUTPATIENT)
Dept: MRI IMAGING | Facility: HOSPITAL | Age: 43
End: 2023-05-12

## 2023-05-12 ENCOUNTER — HOSPITAL ENCOUNTER (EMERGENCY)
Facility: HOSPITAL | Age: 43
Discharge: HOME | End: 2023-05-12
Attending: EMERGENCY MEDICINE

## 2023-05-12 ENCOUNTER — TELEPHONE (OUTPATIENT)
Dept: NEUROSURGERY | Facility: CLINIC | Age: 43
End: 2023-05-12

## 2023-05-12 ENCOUNTER — APPOINTMENT (OUTPATIENT)
Dept: GENERAL RADIOLOGY | Facility: HOSPITAL | Age: 43
End: 2023-05-12

## 2023-05-12 VITALS
SYSTOLIC BLOOD PRESSURE: 99 MMHG | HEIGHT: 65 IN | HEART RATE: 61 BPM | WEIGHT: 118 LBS | OXYGEN SATURATION: 100 % | RESPIRATION RATE: 16 BRPM | DIASTOLIC BLOOD PRESSURE: 55 MMHG | TEMPERATURE: 98 F | BODY MASS INDEX: 19.66 KG/M2

## 2023-05-12 DIAGNOSIS — R42 LIGHTHEADEDNESS: Primary | ICD-10-CM

## 2023-05-12 LAB
ALBUMIN SERPL-MCNC: 4.6 G/DL (ref 3.5–5.2)
ALBUMIN/GLOB SERPL: 1.8 G/DL
ALP SERPL-CCNC: 49 U/L (ref 39–117)
ALT SERPL W P-5'-P-CCNC: 11 U/L (ref 1–33)
ANION GAP SERPL CALCULATED.3IONS-SCNC: 10 MMOL/L (ref 5–15)
AST SERPL-CCNC: 18 U/L (ref 1–32)
BASOPHILS # BLD AUTO: 0.07 10*3/MM3 (ref 0–0.2)
BASOPHILS NFR BLD AUTO: 1.4 % (ref 0–1.5)
BILIRUB SERPL-MCNC: 0.4 MG/DL (ref 0–1.2)
BILIRUB UR QL STRIP: NEGATIVE
BUN SERPL-MCNC: 13 MG/DL (ref 6–20)
BUN/CREAT SERPL: 15.9 (ref 7–25)
CALCIUM SPEC-SCNC: 9.3 MG/DL (ref 8.6–10.5)
CHLORIDE SERPL-SCNC: 107 MMOL/L (ref 98–107)
CLARITY UR: ABNORMAL
CO2 SERPL-SCNC: 22 MMOL/L (ref 22–29)
COLOR UR: YELLOW
CREAT SERPL-MCNC: 0.82 MG/DL (ref 0.57–1)
DEPRECATED RDW RBC AUTO: 41.7 FL (ref 37–54)
EGFRCR SERPLBLD CKD-EPI 2021: 91.7 ML/MIN/1.73
EOSINOPHIL # BLD AUTO: 0.13 10*3/MM3 (ref 0–0.4)
EOSINOPHIL NFR BLD AUTO: 2.6 % (ref 0.3–6.2)
ERYTHROCYTE [DISTWIDTH] IN BLOOD BY AUTOMATED COUNT: 11.8 % (ref 12.3–15.4)
GLOBULIN UR ELPH-MCNC: 2.6 GM/DL
GLUCOSE BLDC GLUCOMTR-MCNC: 94 MG/DL (ref 70–130)
GLUCOSE SERPL-MCNC: 83 MG/DL (ref 65–99)
GLUCOSE UR STRIP-MCNC: NEGATIVE MG/DL
HCG SERPL QL: NEGATIVE
HCT VFR BLD AUTO: 37.7 % (ref 34–46.6)
HGB BLD-MCNC: 12.4 G/DL (ref 12–15.9)
HGB UR QL STRIP.AUTO: NEGATIVE
HOLD SPECIMEN: NORMAL
HOLD SPECIMEN: NORMAL
IMM GRANULOCYTES # BLD AUTO: 0.01 10*3/MM3 (ref 0–0.05)
IMM GRANULOCYTES NFR BLD AUTO: 0.2 % (ref 0–0.5)
KETONES UR QL STRIP: NEGATIVE
LEUKOCYTE ESTERASE UR QL STRIP.AUTO: NEGATIVE
LYMPHOCYTES # BLD AUTO: 1.99 10*3/MM3 (ref 0.7–3.1)
LYMPHOCYTES NFR BLD AUTO: 39.1 % (ref 19.6–45.3)
MAGNESIUM SERPL-MCNC: 2 MG/DL (ref 1.6–2.6)
MCH RBC QN AUTO: 31.3 PG (ref 26.6–33)
MCHC RBC AUTO-ENTMCNC: 32.9 G/DL (ref 31.5–35.7)
MCV RBC AUTO: 95.2 FL (ref 79–97)
MONOCYTES # BLD AUTO: 0.38 10*3/MM3 (ref 0.1–0.9)
MONOCYTES NFR BLD AUTO: 7.5 % (ref 5–12)
NEUTROPHILS NFR BLD AUTO: 2.51 10*3/MM3 (ref 1.7–7)
NEUTROPHILS NFR BLD AUTO: 49.2 % (ref 42.7–76)
NITRITE UR QL STRIP: NEGATIVE
NRBC BLD AUTO-RTO: 0 /100 WBC (ref 0–0.2)
PH UR STRIP.AUTO: 7.5 [PH] (ref 5–8)
PLATELET # BLD AUTO: 279 10*3/MM3 (ref 140–450)
PMV BLD AUTO: 10.3 FL (ref 6–12)
POTASSIUM SERPL-SCNC: 4.2 MMOL/L (ref 3.5–5.2)
PROT SERPL-MCNC: 7.2 G/DL (ref 6–8.5)
PROT UR QL STRIP: NEGATIVE
RBC # BLD AUTO: 3.96 10*6/MM3 (ref 3.77–5.28)
SODIUM SERPL-SCNC: 139 MMOL/L (ref 136–145)
SP GR UR STRIP: 1.01 (ref 1–1.03)
TROPONIN T SERPL HS-MCNC: <6 NG/L
UROBILINOGEN UR QL STRIP: ABNORMAL
WBC NRBC COR # BLD: 5.09 10*3/MM3 (ref 3.4–10.8)
WHOLE BLOOD HOLD COAG: NORMAL
WHOLE BLOOD HOLD SPECIMEN: NORMAL

## 2023-05-12 PROCEDURE — 81003 URINALYSIS AUTO W/O SCOPE: CPT

## 2023-05-12 PROCEDURE — 85025 COMPLETE CBC W/AUTO DIFF WBC: CPT

## 2023-05-12 PROCEDURE — 84703 CHORIONIC GONADOTROPIN ASSAY: CPT | Performed by: EMERGENCY MEDICINE

## 2023-05-12 PROCEDURE — 71045 X-RAY EXAM CHEST 1 VIEW: CPT

## 2023-05-12 PROCEDURE — 93005 ELECTROCARDIOGRAM TRACING: CPT | Performed by: EMERGENCY MEDICINE

## 2023-05-12 PROCEDURE — 99284 EMERGENCY DEPT VISIT MOD MDM: CPT

## 2023-05-12 PROCEDURE — 93005 ELECTROCARDIOGRAM TRACING: CPT

## 2023-05-12 PROCEDURE — 83735 ASSAY OF MAGNESIUM: CPT

## 2023-05-12 PROCEDURE — 82948 REAGENT STRIP/BLOOD GLUCOSE: CPT

## 2023-05-12 PROCEDURE — 70551 MRI BRAIN STEM W/O DYE: CPT

## 2023-05-12 PROCEDURE — 99283 EMERGENCY DEPT VISIT LOW MDM: CPT | Performed by: NURSE PRACTITIONER

## 2023-05-12 PROCEDURE — 84484 ASSAY OF TROPONIN QUANT: CPT

## 2023-05-12 PROCEDURE — 80053 COMPREHEN METABOLIC PANEL: CPT

## 2023-05-12 RX ORDER — SODIUM CHLORIDE 0.9 % (FLUSH) 0.9 %
10 SYRINGE (ML) INJECTION AS NEEDED
Status: DISCONTINUED | OUTPATIENT
Start: 2023-05-12 | End: 2023-05-12 | Stop reason: HOSPADM

## 2023-05-12 RX ORDER — MECLIZINE HYDROCHLORIDE 25 MG/1
25 TABLET ORAL 3 TIMES DAILY PRN
Qty: 9 TABLET | Refills: 0 | Status: SHIPPED | OUTPATIENT
Start: 2023-05-12 | End: 2023-05-16

## 2023-05-12 RX ADMIN — SODIUM CHLORIDE 1000 ML: 9 INJECTION, SOLUTION INTRAVENOUS at 12:22

## 2023-05-12 NOTE — ED NOTES
42 Y/O female pt came in for C/O dizziness since yesterday . Pt describes it more like just being unbalanced when ambulating not so much like the room is spinning. Pt denies any N/V/D. Pt does report some pressure in her ears bilaterally and some nasal pressure. Pt is a alert and oriented x4. NAD noted.pt on CRM. Pt bradycardic at 55 otherwise VSS.

## 2023-05-12 NOTE — ED TRIAGE NOTES
Pt had a cerebral embolization on 4/28. States that since yesterday afternoon she has had dizziness, headache, and felt off balance. Called Dr. Lopez's office and was told to come to the ER.

## 2023-05-12 NOTE — TELEPHONE ENCOUNTER
Patient stated yesterday she started to have ear pressure, dizziness and feeling of being off balance. She denies any nausea or vomiting. She had a cerebral embolization 4/28/23 by Dr. Lopez. Due to the symptoms being sudden change she has been advised to come to the ER to be evaluated.

## 2023-05-12 NOTE — ED PROVIDER NOTES
MD ATTESTATION NOTE    The KERON and I have discussed this patient's history, physical exam, and treatment plan.    I provided a substantive portion of the care of this patient. I personally performed the physical exam, in its entirety. The attached note describes my personal findings.      Oliva Flores is a 43 y.o. female who presents to the ED c/o lightheadedness.  Onset yesterday afternoon but it has been constant.  It affected her ability to drive.  She states that she is able to ambulate although she does feel off.  Nothing makes this worse or better.  On 4/20/2023 she had embolization of the left ICA.      On exam:  GENERAL: not distressed  HENT: nares patent  EYES: no scleral icterus  CV: regular rhythm, regular rate  RESPIRATORY: normal effort, clear to auscultation bilaterally  ABDOMEN: soft, nontender  MUSCULOSKELETAL: no deformity  NEURO:   Recent and remote memory functions are normal. The patient is attentive with normal concentration. Language is fluent. Speech is clear. The speech is non-dysarthric. Fund of knowledge is normal.   Symmetric smile with no facial droop.  Eyes close shut strongly bilaterally.  Symmetric eyebrow raise bilaterally.  EOMI, PERRL  CN II-XII grossly normal otherwise.  5/5 strength to extremities.  No pronator drift.  Intact FNF.  No meningismus.  SKIN: warm, dry    Labs  Recent Results (from the past 24 hour(s))   Comprehensive Metabolic Panel    Collection Time: 05/12/23 11:25 AM    Specimen: Blood   Result Value Ref Range    Glucose 83 65 - 99 mg/dL    BUN 13 6 - 20 mg/dL    Creatinine 0.82 0.57 - 1.00 mg/dL    Sodium 139 136 - 145 mmol/L    Potassium 4.2 3.5 - 5.2 mmol/L    Chloride 107 98 - 107 mmol/L    CO2 22.0 22.0 - 29.0 mmol/L    Calcium 9.3 8.6 - 10.5 mg/dL    Total Protein 7.2 6.0 - 8.5 g/dL    Albumin 4.6 3.5 - 5.2 g/dL    ALT (SGPT) 11 1 - 33 U/L    AST (SGOT) 18 1 - 32 U/L    Alkaline Phosphatase 49 39 - 117 U/L    Total Bilirubin 0.4 0.0 - 1.2 mg/dL     Globulin 2.6 gm/dL    A/G Ratio 1.8 g/dL    BUN/Creatinine Ratio 15.9 7.0 - 25.0    Anion Gap 10.0 5.0 - 15.0 mmol/L    eGFR 91.7 >60.0 mL/min/1.73   Single High Sensitivity Troponin T    Collection Time: 05/12/23 11:25 AM    Specimen: Blood   Result Value Ref Range    HS Troponin T <6 <10 ng/L   Magnesium    Collection Time: 05/12/23 11:25 AM    Specimen: Blood   Result Value Ref Range    Magnesium 2.0 1.6 - 2.6 mg/dL   Green Top (Gel)    Collection Time: 05/12/23 11:25 AM   Result Value Ref Range    Extra Tube Hold for add-ons.    Lavender Top    Collection Time: 05/12/23 11:25 AM   Result Value Ref Range    Extra Tube hold for add-on    Gold Top - SST    Collection Time: 05/12/23 11:25 AM   Result Value Ref Range    Extra Tube Hold for add-ons.    Light Blue Top    Collection Time: 05/12/23 11:25 AM   Result Value Ref Range    Extra Tube Hold for add-ons.    CBC Auto Differential    Collection Time: 05/12/23 11:25 AM    Specimen: Blood   Result Value Ref Range    WBC 5.09 3.40 - 10.80 10*3/mm3    RBC 3.96 3.77 - 5.28 10*6/mm3    Hemoglobin 12.4 12.0 - 15.9 g/dL    Hematocrit 37.7 34.0 - 46.6 %    MCV 95.2 79.0 - 97.0 fL    MCH 31.3 26.6 - 33.0 pg    MCHC 32.9 31.5 - 35.7 g/dL    RDW 11.8 (L) 12.3 - 15.4 %    RDW-SD 41.7 37.0 - 54.0 fl    MPV 10.3 6.0 - 12.0 fL    Platelets 279 140 - 450 10*3/mm3    Neutrophil % 49.2 42.7 - 76.0 %    Lymphocyte % 39.1 19.6 - 45.3 %    Monocyte % 7.5 5.0 - 12.0 %    Eosinophil % 2.6 0.3 - 6.2 %    Basophil % 1.4 0.0 - 1.5 %    Immature Grans % 0.2 0.0 - 0.5 %    Neutrophils, Absolute 2.51 1.70 - 7.00 10*3/mm3    Lymphocytes, Absolute 1.99 0.70 - 3.10 10*3/mm3    Monocytes, Absolute 0.38 0.10 - 0.90 10*3/mm3    Eosinophils, Absolute 0.13 0.00 - 0.40 10*3/mm3    Basophils, Absolute 0.07 0.00 - 0.20 10*3/mm3    Immature Grans, Absolute 0.01 0.00 - 0.05 10*3/mm3    nRBC 0.0 0.0 - 0.2 /100 WBC   hCG, Serum, Qualitative    Collection Time: 05/12/23 11:25 AM    Specimen: Blood   Result  Value Ref Range    HCG Qualitative Negative Negative   ECG 12 Lead ED Triage Standing Order; Weak / Dizzy / AMS    Collection Time: 05/12/23 11:28 AM   Result Value Ref Range    QT Interval 466 ms   Urinalysis With Microscopic If Indicated (No Culture) - Urine, Clean Catch    Collection Time: 05/12/23 11:38 AM    Specimen: Urine, Clean Catch   Result Value Ref Range    Color, UA Yellow Yellow, Straw    Appearance, UA Cloudy (A) Clear    pH, UA 7.5 5.0 - 8.0    Specific Gravity, UA 1.012 1.005 - 1.030    Glucose, UA Negative Negative    Ketones, UA Negative Negative    Bilirubin, UA Negative Negative    Blood, UA Negative Negative    Protein, UA Negative Negative    Leuk Esterase, UA Negative Negative    Nitrite, UA Negative Negative    Urobilinogen, UA 0.2 E.U./dL 0.2 - 1.0 E.U./dL   POC Glucose Once    Collection Time: 05/12/23 12:19 PM    Specimen: Blood   Result Value Ref Range    Glucose 94 70 - 130 mg/dL       Radiology  MRI Brain Without Contrast    Result Date: 5/12/2023  MRI EXAMINATION OF THE BRAIN WITHOUT CONTRAST  HISTORY: Blurred vision, dizziness. 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  COMPARISON: CT head 04/29/2023 and MRI brain 04/27/2023.  TECHNIQUE: A MRI examination of the brain was performed utilizing sagittal T1, axial diffusion, T1, T2, T2 FLAIR and susceptibility weighted imaging.  FINDINGS: There is no evidence of restricted diffusion to suggest acute infarction. The brain and ventricles are symmetrical. There is expected flow-void in the basilar artery and in the distal aspect of the internal carotid arteries bilaterally on the axial T2 sequence. There is signal loss related to the ICA terminus on the left the T2 sequence which corresponds to the Web occlusion device as demonstrated on the CT examination the head performed on 04/29/2023.      There is no evidence of acute infarction. A left ICA terminus aneurysm is appreciated which is been treated with a Web  occlusion device. Further evaluation could be performed with a MRA of the head with and without contrast or with a CT angiogram of the head as indicated.  This report was finalized on 5/12/2023 5:18 PM by Dr. Judd Vargas M.D.      XR Chest 1 View    Result Date: 5/12/2023  XR CHEST 1 VW-  HISTORY: Female who is 43 years-old,  weakness  TECHNIQUE: Frontal views of the chest  COMPARISON: 10/07/2018  FINDINGS: Heart, mediastinum and pulmonary vasculature are unremarkable. No focal pulmonary consolidation, pleural effusion, or pneumothorax. No acute osseous process.      No evidence for acute pulmonary process. Follow-up as clinical indications persist.  This report was finalized on 5/12/2023 12:12 PM by Dr. Corbin Mathis M.D.       Medications given in the ED:  Medications   sodium chloride 0.9 % flush 10 mL (has no administration in time range)   sodium chloride 0.9 % bolus 1,000 mL (1,000 mL Intravenous New Bag 5/12/23 1222)       Orders placed during this visit:  Orders Placed This Encounter   Procedures    XR Chest 1 View    MRI Brain Without Contrast    [place] Draw    Comprehensive Metabolic Panel    Single High Sensitivity Troponin T    Magnesium    Urinalysis With Microscopic If Indicated (No Culture) - Urine, Clean Catch    CBC Auto Differential    hCG, Serum, Qualitative    NPO Diet NPO Type: Strict NPO    Undress & Gown    Cardiac Monitoring    Continuous Pulse Oximetry    Vital Signs    Orthostatic Blood Pressure    Oxygen Therapy- Nasal Cannula; 2 LPM; Titrate for SPO2: 92%, Greater Than or Equal To    POC Glucose Once    POC Glucose Once    ECG 12 Lead ED Triage Standing Order; Weak / Dizzy / AMS    Insert Peripheral IV    Fall Precautions    CBC & Differential    Green Top (Gel)    Lavender Top    Gold Top - SST    Light Blue Top       Medical Decision Making:  ED Course as of 05/12/23 1735   Fri May 12, 2023   1144 WBC: 5.09 [DC]   1144 Hemoglobin: 12.4 [DC]   1144 Platelets: 279 [DC]   1214 EKG  independently interpreted by myself.  Time 11:28 AM.  Sinus rhythm.  Heart rate 54.  Normal axis.  No acute ST abnormality. [TD]   1248 I discussed the case with Dr. Watkins, stroke neurology.  We reviewed patient's labs, history and exam.  She recommends MRI.  No value added to getting CT or CT at this time. [TD]   1732 MRI shows no evidence of acute infarction.  Left ICA terminus aneurysm which has been treated with a web occlusion device. [TD]   1732 Patient ambulates to the emergency department without difficulty. [TD]      ED Course User Index  [DC] Najma Clemons PA  [TD] Dany Diaz II, MD             PPE: Both the patient and I wore a surgical mask throughout the entire patient encounter.     Diagnosis  Final diagnoses:   Lightheadedness     DISCHARGE    FOLLOW-UP  [facility] Emergency Department  [address]  [place] [facility] [address] [phone]  Go to   If symptoms worsen         Medication List        New Prescriptions      meclizine 25 MG tablet  Commonly known as: ANTIVERT  Take 1 tablet by mouth 3 (Three) Times a Day As Needed for Dizziness for up to 3 days.               Where to Get Your Medications        These medications were sent to [id number] - [place], [place] - [address] AT [facility] - [phone] [facility] [phone]   [address], [facility] [address]      Phone: [phone]   meclizine 25 MG tablet            Dany Diaz II, MD  05/12/23 5471

## 2023-05-12 NOTE — CONSULTS
[facility] NEUROSURGERY CONSULT NOTE    Patient name: Oliva Flores  Referring Provider: Dr. Diaz  Reason for Consultation: Recent web embolization of L ICA aneurysm.    Patient Care Team:  Cande Prakash APRN as PCP - General (Nurse Practitioner)    Chief complaint: Dizziness, balance issues    Subjective .     History of present illness:    Patient is a 43 y.o. female who was recently seen at this facility and treated for a left internal carotid artery aneurysm.  She underwent angioplasty with web embolization.  Since being home she has felt pretty well until yesterday.  She reports acute onset of dizziness associated with off balanced gait.  She attempted to try and sleep through this, but symptoms would not subside even lying down.  She woke up with the same symptoms and decided to come to the ED for further evaluation.  She denies associated headache, visual disturbance, nausea or vomiting.    Review of Systems  Review of Systems   Constitutional: Negative for chills and fever.   Gastrointestinal: Negative for nausea and vomiting.   Musculoskeletal: Positive for gait problem.   Neurological: Positive for dizziness. Negative for headaches.       History  PAST MEDICAL HISTORY  Past Medical History:   Diagnosis Date   • Abnormal Pap smear of cervix        PAST SURGICAL HISTORY  Past Surgical History:   Procedure Laterality Date   • CERVICAL BIOPSY  W/ LOOP ELECTRODE EXCISION         FAMILY HISTORY  History reviewed. No pertinent family history.    SOCIAL HISTORY  Social History     Tobacco Use   • Smoking status: Every Day     Packs/day: 0.25     Types: Cigarettes   Vaping Use   • Vaping Use: Every day   Substance Use Topics   • Alcohol use: Yes     Comment: ocassional   • Drug use: No       Allergies:  Oxycodone and Phenergan [promethazine hcl]    MEDICATIONS:  (Not in a hospital admission)      Objective     Results Review:  LABS:    Admission on 05/12/2023   Component Date Value Ref Range Status   • QT  Interval 05/12/2023 466  ms Preliminary   • Glucose 05/12/2023 83  65 - 99 mg/dL Final   • BUN 05/12/2023 13  6 - 20 mg/dL Final   • Creatinine 05/12/2023 0.82  0.57 - 1.00 mg/dL Final   • Sodium 05/12/2023 139  136 - 145 mmol/L Final   • Potassium 05/12/2023 4.2  3.5 - 5.2 mmol/L Final    Slight hemolysis detected by analyzer. Results may be affected.   • Chloride 05/12/2023 107  98 - 107 mmol/L Final   • CO2 05/12/2023 22.0  22.0 - 29.0 mmol/L Final   • Calcium 05/12/2023 9.3  8.6 - 10.5 mg/dL Final   • Total Protein 05/12/2023 7.2  6.0 - 8.5 g/dL Final   • Albumin 05/12/2023 4.6  3.5 - 5.2 g/dL Final   • ALT (SGPT) 05/12/2023 11  1 - 33 U/L Final   • AST (SGOT) 05/12/2023 18  1 - 32 U/L Final    Slight hemolysis detected by analyzer. Results may be affected.   • Alkaline Phosphatase 05/12/2023 49  39 - 117 U/L Final   • Total Bilirubin 05/12/2023 0.4  0.0 - 1.2 mg/dL Final   • Globulin 05/12/2023 2.6  gm/dL Final   • A/G Ratio 05/12/2023 1.8  g/dL Final   • BUN/Creatinine Ratio 05/12/2023 15.9  7.0 - 25.0 Final   • Anion Gap 05/12/2023 10.0  5.0 - 15.0 mmol/L Final   • eGFR 05/12/2023 91.7  >60.0 mL/min/1.73 Final   • HS Troponin T 05/12/2023 <6  <10 ng/L Final   • Magnesium 05/12/2023 2.0  1.6 - 2.6 mg/dL Final   • Color, UA 05/12/2023 Yellow  Yellow, Straw Final   • Appearance, UA 05/12/2023 Cloudy (A)  Clear Final   • pH, UA 05/12/2023 7.5  5.0 - 8.0 Final   • Specific Gravity, UA 05/12/2023 1.012  1.005 - 1.030 Final   • Glucose, UA 05/12/2023 Negative  Negative Final   • Ketones, UA 05/12/2023 Negative  Negative Final   • Bilirubin, UA 05/12/2023 Negative  Negative Final   • Blood, UA 05/12/2023 Negative  Negative Final   • Protein, UA 05/12/2023 Negative  Negative Final   • Leuk Esterase, UA 05/12/2023 Negative  Negative Final   • Nitrite, UA 05/12/2023 Negative  Negative Final   • Urobilinogen, UA 05/12/2023 0.2 E.U./dL  0.2 - 1.0 E.U./dL Final   • Extra Tube 05/12/2023 Hold for add-ons.   Final    Auto  resulted.   • Extra Tube 05/12/2023 hold for add-on   Final    Auto resulted   • Extra Tube 05/12/2023 Hold for add-ons.   Final    Auto resulted.   • Extra Tube 05/12/2023 Hold for add-ons.   Final    Auto resulted   • WBC 05/12/2023 5.09  3.40 - 10.80 10*3/mm3 Final   • RBC 05/12/2023 3.96  3.77 - 5.28 10*6/mm3 Final   • Hemoglobin 05/12/2023 12.4  12.0 - 15.9 g/dL Final   • Hematocrit 05/12/2023 37.7  34.0 - 46.6 % Final   • MCV 05/12/2023 95.2  79.0 - 97.0 fL Final   • MCH 05/12/2023 31.3  26.6 - 33.0 pg Final   • MCHC 05/12/2023 32.9  31.5 - 35.7 g/dL Final   • RDW 05/12/2023 11.8 (L)  12.3 - 15.4 % Final   • RDW-SD 05/12/2023 41.7  37.0 - 54.0 fl Final   • MPV 05/12/2023 10.3  6.0 - 12.0 fL Final   • Platelets 05/12/2023 279  140 - 450 10*3/mm3 Final   • Neutrophil % 05/12/2023 49.2  42.7 - 76.0 % Final   • Lymphocyte % 05/12/2023 39.1  19.6 - 45.3 % Final   • Monocyte % 05/12/2023 7.5  5.0 - 12.0 % Final   • Eosinophil % 05/12/2023 2.6  0.3 - 6.2 % Final   • Basophil % 05/12/2023 1.4  0.0 - 1.5 % Final   • Immature Grans % 05/12/2023 0.2  0.0 - 0.5 % Final   • Neutrophils, Absolute 05/12/2023 2.51  1.70 - 7.00 10*3/mm3 Final   • Lymphocytes, Absolute 05/12/2023 1.99  0.70 - 3.10 10*3/mm3 Final   • Monocytes, Absolute 05/12/2023 0.38  0.10 - 0.90 10*3/mm3 Final   • Eosinophils, Absolute 05/12/2023 0.13  0.00 - 0.40 10*3/mm3 Final   • Basophils, Absolute 05/12/2023 0.07  0.00 - 0.20 10*3/mm3 Final   • Immature Grans, Absolute 05/12/2023 0.01  0.00 - 0.05 10*3/mm3 Final   • nRBC 05/12/2023 0.0  0.0 - 0.2 /100 WBC Final   • Glucose 05/12/2023 94  70 - 130 mg/dL Final    Meter: [id number] : [id number] Joan Galicia RN   • HCG Qualitative 05/12/2023 Negative  Negative Final       DIAGNOSTICS:  MRI brain pending    Results Review:   I reviewed the patient's new clinical results.  I personally viewed and interpreted the patient's chart    Vital Signs   Temp:  [97.6 °F (36.4 °C)] 97.6 °F (36.4 °C)  Heart Rate:   "[57-66] 63  Resp:  [18] 18  BP: (95-99)/(48-65) 99/65    Physical Exam:    Physical exam  Awake, alert, oriented x3  Pupils equal round reactive to light  Extraocular muscles intact  Face symmetric  Speech is fluent and clear  No pronator drift  Motor exam  Bilateral deltoids 5/5, bilateral biceps 5/5, bilateral triceps 5/5, bilateral wrist extension 5/5 bilateral hand  5/5  Bilateral hip flexion 5/5, bilateral knee extension 5/5, bilateral DF/PF 5/5  gait deferred  Able to detect light touch in all 4 extremities      Assessment & Plan       Aneurysm of internal carotid artery    Dizziness      PLAN:    · Await MRI brain results.  If negative, okay to discharge home from a neurosurgical standpoint.  She has follow-up scheduled with us on 5/16/2023.    I discussed the patient's findings and my recommendations with Dr. Lopez, patient, nursing staff and primary care team    During patient visit, I utilized appropriate personal protective equipment including mask and gloves.  Mask used was standard procedure mask. Appropriate PPE was worn during the entire visit.  Hand hygiene was completed before and after.     Eloina Carpenter, APRN  05/12/23  15:59 EDT    \"Dictated utilizing Dragon dictation\".      "

## 2023-05-12 NOTE — TELEPHONE ENCOUNTER
Patient had a CEREBRAL ARTERIOGRAM WITH INTRACRANIAL EMBOLIZATION performed on  04/28/2023. Patient is calling today complaining of dizziness, ear pressure and her balance off that started yesterday. She denies any falls. She also denies headaches.

## 2023-05-12 NOTE — ED PROVIDER NOTES
EMERGENCY DEPARTMENT ENCOUNTER    Room Number:  17/17  Date of encounter:  5/12/2023  PCP: Cande Prakash APRN  Patient Care Team:  Cande Prakash APRN as PCP - General (Nurse Practitioner)   Independent Historians: Patient    HPI:  Chief Complaint: Lightheadedness  A complete HPI/ROS/PMH/PSH/SH/FH are unobtainable due to: None    Chronic or social conditions impacting patient care (social determinants of health): None    Context: Oliva Flores is a 43 y.o. female who presents to the ED c/o lightheadedness that began yesterday but has persisted today.  Patient underwent embolization of aneurysm on 4/28/2023 and contacted neurosurgeon who recommended she come to the ER for evaluation.  She states she is taking 81 mg aspirin since the procedure.  She states the lightheadedness does not change based on position and she continues to have this even while lying down.  She has not had any nausea, vomiting, diarrhea.  She denies any chest pain, shortness of breath, headache, vision changes.  She states she initially had presented for intermittent loss of vision when it was determined she had the aneurysm.  She has not had any similar symptoms.  She does not take any medication for blood pressure and states her blood pressure normally runs low.  She denies syncope.    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  Spear to be successful endovascular treatment using web intra saccular device.  Recommended follow-up angiography in 6 months.    Review of prior external test results outside of this encounter: CT head on 4/29/2023 shows embolization of distal left internal carotid artery aneurysm.  There was no acute intracranial hemorrhage.    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    PAST SURGICAL HISTORY  Past Surgical History:   Procedure Laterality Date   • CERVICAL BIOPSY  W/ LOOP ELECTRODE EXCISION           FAMILY HISTORY  History reviewed. No pertinent family history.      SOCIAL HISTORY  Social History     Socioeconomic History   • Marital status:    Tobacco Use   • Smoking status: Every Day     Packs/day: 0.25     Types: Cigarettes   Vaping Use   • Vaping Use: Every day   Substance and Sexual Activity   • Alcohol use: Yes     Comment: ocassional   • Drug use: No   • Sexual activity: Yes         ALLERGIES  Oxycodone and Phenergan [promethazine hcl]        REVIEW OF SYSTEMS  Review of Systems   Constitutional: Negative for chills and fever.   Eyes: Negative for photophobia and visual disturbance.   Respiratory: Negative for shortness of breath.    Cardiovascular: Negative for chest pain.   Gastrointestinal: Negative for abdominal pain, diarrhea, nausea and vomiting.   Genitourinary: Negative for dysuria, flank pain and frequency.   Musculoskeletal: Negative for back pain and neck pain.   Neurological: Positive for light-headedness. Negative for dizziness, syncope, speech difficulty, weakness, numbness and headaches.        All systems reviewed and negative except for those discussed in HPI.       PHYSICAL EXAM    I have reviewed the triage vital signs and nursing notes.    ED Triage Vitals   Temp Heart Rate Resp BP SpO2   05/12/23 1109 05/12/23 1109 05/12/23 1109 05/12/23 1117 05/12/23 1109   97.6 °F (36.4 °C) 66 18 95/63 100 %      Temp src Heart Rate Source Patient Position BP Location FiO2 (%)   05/12/23 1109 05/12/23 1109 05/12/23 1119 05/12/23 1119 --   Tympanic Monitor Lying Right arm        Physical Exam  GENERAL:  alert, no acute distress  SKIN: Warm, dry  HENT: Normocephalic, atraumatic  EYES: no scleral icterus, PERRL, EOMI  CV: regular rhythm, regular rate  RESPIRATORY: normal effort, lungs clear  ABDOMEN: soft, nontender, nondistended  MUSCULOSKELETAL: no deformity  NEURO: Alert and oriented, no facial droop, speech clear, no dysarthria or aphasia, moves all extremities well, sensation intact light touch all extremities, no focal deficits            LAB RESULTS  Recent Results (from the past 24 hour(s))   Comprehensive Metabolic Panel    Collection Time: 05/12/23 11:25 AM    Specimen: Blood   Result Value Ref Range    Glucose 83 65 - 99 mg/dL    BUN 13 6 - 20 mg/dL    Creatinine 0.82 0.57 - 1.00 mg/dL    Sodium 139 136 - 145 mmol/L    Potassium 4.2 3.5 - 5.2 mmol/L    Chloride 107 98 - 107 mmol/L    CO2 22.0 22.0 - 29.0 mmol/L    Calcium 9.3 8.6 - 10.5 mg/dL    Total Protein 7.2 6.0 - 8.5 g/dL    Albumin 4.6 3.5 - 5.2 g/dL    ALT (SGPT) 11 1 - 33 U/L    AST (SGOT) 18 1 - 32 U/L    Alkaline Phosphatase 49 39 - 117 U/L    Total Bilirubin 0.4 0.0 - 1.2 mg/dL    Globulin 2.6 gm/dL    A/G Ratio 1.8 g/dL    BUN/Creatinine Ratio 15.9 7.0 - 25.0    Anion Gap 10.0 5.0 - 15.0 mmol/L    eGFR 91.7 >60.0 mL/min/1.73   Single High Sensitivity Troponin T    Collection Time: 05/12/23 11:25 AM    Specimen: Blood   Result Value Ref Range    HS Troponin T <6 <10 ng/L   Magnesium    Collection Time: 05/12/23 11:25 AM    Specimen: Blood   Result Value Ref Range    Magnesium 2.0 1.6 - 2.6 mg/dL   Green Top (Gel)    Collection Time: 05/12/23 11:25 AM   Result Value Ref Range    Extra Tube Hold for add-ons.    Lavender Top    Collection Time: 05/12/23 11:25 AM   Result Value Ref Range    Extra Tube hold for add-on    Gold Top - SST    Collection Time: 05/12/23 11:25 AM   Result Value Ref Range    Extra Tube Hold for add-ons.    Light Blue Top    Collection Time: 05/12/23 11:25 AM   Result Value Ref Range    Extra Tube Hold for add-ons.    CBC  Auto Differential    Collection Time: 05/12/23 11:25 AM    Specimen: Blood   Result Value Ref Range    WBC 5.09 3.40 - 10.80 10*3/mm3    RBC 3.96 3.77 - 5.28 10*6/mm3    Hemoglobin 12.4 12.0 - 15.9 g/dL    Hematocrit 37.7 34.0 - 46.6 %    MCV 95.2 79.0 - 97.0 fL    MCH 31.3 26.6 - 33.0 pg    MCHC 32.9 31.5 - 35.7 g/dL    RDW 11.8 (L) 12.3 - 15.4 %    RDW-SD 41.7 37.0 - 54.0 fl    MPV 10.3 6.0 - 12.0 fL    Platelets 279 140 - 450 10*3/mm3    Neutrophil % 49.2 42.7 - 76.0 %    Lymphocyte % 39.1 19.6 - 45.3 %    Monocyte % 7.5 5.0 - 12.0 %    Eosinophil % 2.6 0.3 - 6.2 %    Basophil % 1.4 0.0 - 1.5 %    Immature Grans % 0.2 0.0 - 0.5 %    Neutrophils, Absolute 2.51 1.70 - 7.00 10*3/mm3    Lymphocytes, Absolute 1.99 0.70 - 3.10 10*3/mm3    Monocytes, Absolute 0.38 0.10 - 0.90 10*3/mm3    Eosinophils, Absolute 0.13 0.00 - 0.40 10*3/mm3    Basophils, Absolute 0.07 0.00 - 0.20 10*3/mm3    Immature Grans, Absolute 0.01 0.00 - 0.05 10*3/mm3    nRBC 0.0 0.0 - 0.2 /100 WBC   hCG, Serum, Qualitative    Collection Time: 05/12/23 11:25 AM    Specimen: Blood   Result Value Ref Range    HCG Qualitative Negative Negative   ECG 12 Lead ED Triage Standing Order; Weak / Dizzy / AMS    Collection Time: 05/12/23 11:28 AM   Result Value Ref Range    QT Interval 466 ms   Urinalysis With Microscopic If Indicated (No Culture) - Urine, Clean Catch    Collection Time: 05/12/23 11:38 AM    Specimen: Urine, Clean Catch   Result Value Ref Range    Color, UA Yellow Yellow, Straw    Appearance, UA Cloudy (A) Clear    pH, UA 7.5 5.0 - 8.0    Specific Gravity, UA 1.012 1.005 - 1.030    Glucose, UA Negative Negative    Ketones, UA Negative Negative    Bilirubin, UA Negative Negative    Blood, UA Negative Negative    Protein, UA Negative Negative    Leuk Esterase, UA Negative Negative    Nitrite, UA Negative Negative    Urobilinogen, UA 0.2 E.U./dL 0.2 - 1.0 E.U./dL   POC Glucose Once    Collection Time: 05/12/23 12:19 PM    Specimen: Blood   Result  Value Ref Range    Glucose 94 70 - 130 mg/dL       Ordered the above labs and independently reviewed and interpreted the results.        RADIOLOGY  MRI Brain Without Contrast    Result Date: 5/12/2023  MRI EXAMINATION OF THE BRAIN WITHOUT CONTRAST  HISTORY: Blurred vision, dizziness. 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  COMPARISON: CT head 04/29/2023 and MRI brain 04/27/2023.  TECHNIQUE: A MRI examination of the brain was performed utilizing sagittal T1, axial diffusion, T1, T2, T2 FLAIR and susceptibility weighted imaging.  FINDINGS: There is no evidence of restricted diffusion to suggest acute infarction. The brain and ventricles are symmetrical. There is expected flow-void in the basilar artery and in the distal aspect of the internal carotid arteries bilaterally on the axial T2 sequence. There is signal loss related to the ICA terminus on the left the T2 sequence which corresponds to the Web occlusion device as demonstrated on the CT examination the head performed on 04/29/2023.      There is no evidence of acute infarction. A left ICA terminus aneurysm is appreciated which is been treated with a Web occlusion device. Further evaluation could be performed with a MRA of the head with and without contrast or with a CT angiogram of the head as indicated.  This report was finalized on 5/12/2023 5:18 PM by Dr. Judd Vargas M.D.      XR Chest 1 View    Result Date: 5/12/2023  XR CHEST 1 VW-  HISTORY: Female who is 43 years-old,  weakness  TECHNIQUE: Frontal views of the chest  COMPARISON: 10/07/2018  FINDINGS: Heart, mediastinum and pulmonary vasculature are unremarkable. No focal pulmonary consolidation, pleural effusion, or pneumothorax. No acute osseous process.      No evidence for acute pulmonary process. Follow-up as clinical indications persist.  This report was finalized on 5/12/2023 12:12 PM by Dr. Corbin Mathis M.D.        I ordered the above noted radiological studies.  Independently reviewed and interpreted by me.  See dictation for official radiology interpretation.      PROCEDURES    Procedures      MEDICATIONS GIVEN IN ER    Medications   sodium chloride 0.9 % flush 10 mL (has no administration in time range)   sodium chloride 0.9 % bolus 1,000 mL (0 mL Intravenous Stopped 5/12/23 1810)         PROGRESS, DATA ANALYSIS, CONSULTS, AND MEDICAL DECISION MAKING    All labs have been independently reviewed and interpreted by me.  All radiology studies have been independently reviewed and interpreted by me and discussed with radiologist dictating the report.   EKG's independently reviewed and interpreted by me.  Discussion below represents my analysis of pertinent findings related to patient's condition, differential diagnosis, treatment plan and final disposition.    My differential diagnosis for dizziness includes but is not limited to:  BPPV, cerebellar stroke, migrainous vertigo, Meniere's disease, vestibular neuronitis, acute labyrinthitis, PE, anemia, MI, acoustic neuroma, vertebral artery dissection, drug-induced, hypotension, cardiac arrhythmia      ED Course as of 05/12/23 1831   Fri May 12, 2023   1144 WBC: 5.09 [DC]   1144 Hemoglobin: 12.4 [DC]   1144 Platelets: 279 [DC]   1214 EKG independently interpreted by myself.  Time 11:28 AM.  Sinus rhythm.  Heart rate 54.  Normal axis.  No acute ST abnormality. [TD]   1248 I discussed the case with Dr. Watkins, stroke neurology.  We reviewed patient's labs, history and exam.  She recommends MRI.  No value added to getting CT or CT at this time. [TD]   1732 MRI shows no evidence of acute infarction.  Left ICA terminus aneurysm which has been treated with a web occlusion device. [TD]   1732 Patient ambulates to the emergency department without difficulty. [TD]      ED Course User Index  [DC] Najma Clemons PA  [TD] Dany Diaz II, MD           PPE: Patient was placed in face mask in first look. Patient was wearing facemask  when I entered the room and throughout our encounter. I wore full protective equipment throughout this patient encounter including a face mask, and gloves. Hand hygiene was performed before donning protective equipment and after removal when leaving the room.        AS OF 18:31 EDT VITALS:    BP - 99/55  HR - 61  TEMP - 98 °F (36.7 °C) (Oral)  O2 SATS - 100%        DIAGNOSIS  Final diagnoses:   Lightheadedness         DISPOSITION  ED Disposition     ED Disposition   Discharge    Condition   Stable    Comment   --                Note Disclaimer: At [facility], we believe that sharing information builds trust and better relationships. You are receiving this note because you recently visited [facility]. It is possible you will see health information before a provider has talked with you about it. This kind of information can be easy to misunderstand. To help you fully understand what it means for your health, we urge you to discuss this note with your provider.       Najma Clemons PA  05/12/23 8001

## 2023-05-14 LAB — QT INTERVAL: 466 MS

## 2023-05-16 ENCOUNTER — OFFICE VISIT (OUTPATIENT)
Dept: NEUROSURGERY | Facility: CLINIC | Age: 43
End: 2023-05-16

## 2023-05-16 ENCOUNTER — READMISSION MANAGEMENT (OUTPATIENT)
Dept: CALL CENTER | Facility: HOSPITAL | Age: 43
End: 2023-05-16

## 2023-05-16 VITALS
SYSTOLIC BLOOD PRESSURE: 112 MMHG | BODY MASS INDEX: 20.19 KG/M2 | HEIGHT: 65 IN | HEART RATE: 52 BPM | WEIGHT: 121.2 LBS | DIASTOLIC BLOOD PRESSURE: 68 MMHG | OXYGEN SATURATION: 99 % | TEMPERATURE: 97.7 F

## 2023-05-16 DIAGNOSIS — H53.8 BLURRED VISION: ICD-10-CM

## 2023-05-16 DIAGNOSIS — I67.1 ANEURYSM OF INTERNAL CAROTID ARTERY: Primary | ICD-10-CM

## 2023-05-16 DIAGNOSIS — R42 DIZZINESS: ICD-10-CM

## 2023-05-16 PROCEDURE — 99214 OFFICE O/P EST MOD 30 MIN: CPT | Performed by: RADIOLOGY

## 2023-05-16 NOTE — OUTREACH NOTE
Medical Week 2 Survey    Flowsheet Row Responses   [facility] patient discharged from? [place]   Does the patient have one of the following disease processes/diagnoses(primary or secondary)? Other   Week 2 attempt successful? Yes   Call start time 1715   Discharge diagnosis **Blurred vision Aneurysm of internal carotid artery underwent IR embolization    Call end time 1717   Meds reviewed with patient/caregiver? Yes   Is the patient having any side effects they believe may be caused by any medication additions or changes? No   Does the patient have all medications ordered at discharge? Yes   Is the patient taking all medications as directed (includes completed medication regime)? Yes   Does the patient have a primary care provider?  Yes   Has the patient kept scheduled appointments due by today? Yes   Psychosocial issues? No   What is the patient's perception of their health status since discharge? Improving   Is the patient/caregiver able to teach back signs and symptoms related to disease process for when to call PCP? Yes   Is the patient/caregiver able to teach back signs and symptoms related to disease process for when to call 911? Yes   Is the patient/caregiver able to teach back the hierarchy of who to call/visit for symptoms/problems? PCP, Specialist, Home health nurse, Urgent Care, ED, 911 Yes   Additional teach back comments Blurred vision has improved   Week 2 Call Completed? Yes   Graduated Yes   Graduated/Revoked comments Denies questions or needs at this time.          Amrita LAIRD - Licensed Nurse

## 2023-05-25 ENCOUNTER — OFFICE VISIT (OUTPATIENT)
Dept: CARDIOLOGY | Facility: CLINIC | Age: 43
End: 2023-05-25
Payer: MEDICAID

## 2023-05-25 VITALS
BODY MASS INDEX: 19.99 KG/M2 | HEIGHT: 65 IN | DIASTOLIC BLOOD PRESSURE: 82 MMHG | WEIGHT: 120 LBS | SYSTOLIC BLOOD PRESSURE: 106 MMHG | HEART RATE: 69 BPM

## 2023-05-25 DIAGNOSIS — R55 NEAR SYNCOPE: Primary | ICD-10-CM

## 2023-05-25 PROCEDURE — 99204 OFFICE O/P NEW MOD 45 MIN: CPT | Performed by: INTERNAL MEDICINE

## 2023-05-25 RX ORDER — FLUTICASONE PROPIONATE 50 MCG
2 SPRAY, SUSPENSION (ML) NASAL DAILY
COMMUNITY

## 2023-05-25 NOTE — PROGRESS NOTES
[facility]      Patient Name: Oliva Flores  :1980  Age: 43 y.o.  Encounter Provider:  Zachary Pack Jr, MD      Chief Complaint:   Chief Complaint   Patient presents with   • ER visit         HPI  Oliva Flores is a 43 y.o. female past medical history of carotid aneurysm status postembolization who presents for initial evaluation of lightheadedness.  In mid April the patient had transient episodes of visual deficits.  She was found to have carotid aneurysm which was treated through embolization.  She did well for a few weeks after that but acutely developed dizziness that was constant for several days until she was seen in the emergency room on 2023.  Normal MRI brain with no evidence of acute infarction.  Blood pressure and heart rate were well controlled in the ER and the remainder of work-up was basically unremarkable.  She continues to have constant dizziness.  She was seen by PCP yesterday who feels that there is significant fluid buildup in the right ear deep to the tympanic membrane.  She denies any difference in intensity of dizziness with postural changes.  Rare palpitations but none that have been associated with the presenting complaints.  No orthopnea, PND or edema.  No palpitations, dizziness or syncope.  She quit smoking last month, drinks rarely and denies illicit drug use.  No family history of premature coronary artery disease or sudden cardiac death.        The following portions of the patient's history were reviewed and updated as appropriate: allergies, current medications, past family history, past medical history, past social history, past surgical history and problem list.      Review of Systems   Constitutional: Negative for chills and fever.   HENT: Negative for hoarse voice and sore throat.    Eyes: Negative for double vision and photophobia.   Cardiovascular: Positive for palpitations. Negative for chest pain, leg swelling, near-syncope,  "orthopnea, paroxysmal nocturnal dyspnea and syncope.   Respiratory: Negative for cough and wheezing.    Skin: Negative for poor wound healing and rash.   Musculoskeletal: Negative for arthritis and joint swelling.   Gastrointestinal: Negative for bloating, abdominal pain, hematemesis and hematochezia.   Neurological: Positive for dizziness. Negative for focal weakness.   Psychiatric/Behavioral: Negative for depression and suicidal ideas.       OBJECTIVE:   Vital Signs  Vitals:    05/25/23 1245   BP: 106/82   Pulse: 69     Estimated body mass index is 19.97 kg/m² as calculated from the following:    Height as of this encounter: 165.1 cm (65\").    Weight as of this encounter: 54.4 kg (120 lb).    Vitals reviewed.   Constitutional:       Appearance: Healthy appearance. Not in distress.   Neck:      Vascular: No JVR. JVD normal.   Pulmonary:      Effort: Pulmonary effort is normal.      Breath sounds: Normal breath sounds. No wheezing. No rhonchi. No rales.   Chest:      Chest wall: Not tender to palpatation.   Cardiovascular:      PMI at left midclavicular line. Normal rate. Regular rhythm. Normal S1. Normal S2.      Murmurs: There is no murmur.      No gallop. No click. No rub.   Pulses:     Intact distal pulses.   Edema:     Peripheral edema absent.   Abdominal:      General: Bowel sounds are normal.      Palpations: Abdomen is soft.      Tenderness: There is no abdominal tenderness.   Musculoskeletal: Normal range of motion.         General: No tenderness. Skin:     General: Skin is warm and dry.   Neurological:      General: No focal deficit present.      Mental Status: Alert and oriented to person, place and time.         Procedures    Lipid Panel        4/27/2023    04:49   Lipid Panel   Total Cholesterol 130     Triglycerides 76     HDL Cholesterol 56     VLDL Cholesterol 15     LDL Cholesterol  59     LDL/HDL Ratio 1.05          BUN   Date Value Ref Range Status   05/12/2023 13 6 - 20 mg/dL Final     Creatinine "   Date Value Ref Range Status   05/12/2023 0.82 0.57 - 1.00 mg/dL Final     Potassium   Date Value Ref Range Status   05/12/2023 4.2 3.5 - 5.2 mmol/L Final     Comment:     Slight hemolysis detected by analyzer. Results may be affected.     ALT (SGPT)   Date Value Ref Range Status   05/12/2023 11 1 - 33 U/L Final     AST (SGOT)   Date Value Ref Range Status   05/12/2023 18 1 - 32 U/L Final     Comment:     Slight hemolysis detected by analyzer. Results may be affected.           ASSESSMENT:     43-year-old female with carotid aneurysm treated by embolization presents for evaluation of dizziness    PLAN OF CARE:     1. Dizziness -constant intensity regardless of postural changes.  Clinical story seems more consistent with neurologic or ENT pathology.  Given her palpitations we will check a Holter monitor.  Check echocardiogram to exclude structural heart disease.  She has had borderline low blood pressure and heart rate but feels that this is her baseline.  2. Carotid aneurysm -status postembolization 4/20/2023.  Following with vascular surgery    Return to clinic 6 months             Discharge Medications          Accurate as of May 25, 2023  1:16 PM. If you have any questions, ask your nurse or doctor.            Continue These Medications      Instructions Start Date   Aspirin Low Dose 81 MG chewable tablet  Generic drug: aspirin   81 mg, Oral, Daily      CLARITIN PO   1 tablet, Oral, Daily      fluticasone 50 MCG/ACT nasal spray  Commonly known as: FLONASE   2 sprays, Nasal, Daily             Thank you for allowing me to participate in the care of your patient,      Sincerely,   Zachary Pack Jr, MD  [facility]  05/25/23  13:16 EDT

## 2023-06-19 ENCOUNTER — TELEPHONE (OUTPATIENT)
Dept: NEUROSURGERY | Facility: CLINIC | Age: 43
End: 2023-06-19

## 2023-06-19 NOTE — TELEPHONE ENCOUNTER
Patient called stating that she had some dental work done and her dentist wants to make sure it is ok for her to have amoxicillin to help with the infection in her tooth

## 2023-06-19 NOTE — TELEPHONE ENCOUNTER
Dr. Lopez,    Pt had a cerebral angiogram on 04/28/2023 for left ICA aneurysm. Her dentist office is wanting to know if it is okay that she takes amoxicillin for her tooth infection? Please advise. Thank you.

## 2023-06-19 NOTE — TELEPHONE ENCOUNTER
Per Dr. Lopez, it is okay for patient to take amoxicillin for tooth infection. I called and s/w pt and advised her of this. Pt voiced understanding.

## 2023-07-11 PROBLEM — R27.0 ATAXIA, UNSPECIFIED: Status: ACTIVE | Noted: 2023-07-11

## 2023-08-29 ENCOUNTER — HOSPITAL ENCOUNTER (OUTPATIENT)
Dept: MRI IMAGING | Facility: HOSPITAL | Age: 43
Discharge: HOME | End: 2023-08-29
Admitting: RADIOLOGY

## 2023-08-29 DIAGNOSIS — R42 DIZZINESS: ICD-10-CM

## 2023-08-29 DIAGNOSIS — I67.1 CEREBRAL ANEURYSM, NONRUPTURED: ICD-10-CM

## 2023-08-29 PROCEDURE — 82565 ASSAY OF CREATININE: CPT

## 2023-08-29 PROCEDURE — 70553 MRI BRAIN STEM W/O & W/DYE: CPT

## 2023-08-29 PROCEDURE — 0 GADOBENATE DIMEGLUMINE 529 MG/ML SOLUTION: Performed by: RADIOLOGY

## 2023-08-29 PROCEDURE — A9577 INJ MULTIHANCE: HCPCS | Performed by: RADIOLOGY

## 2023-08-29 RX ADMIN — GADOBENATE DIMEGLUMINE 12 ML: 529 INJECTION, SOLUTION INTRAVENOUS at 20:10

## 2023-08-30 LAB — CREAT BLDA-MCNC: 0.8 MG/DL (ref 0.6–1.3)

## 2024-05-10 NOTE — PROGRESS NOTES
Subjective   History of Present Illness: Oliva Flores is a 44 y.o. female is here today for follow-up pre-angio. Hx of Aneurysm.      History of Present Illness  44 y.o. female with no past medical history of significance who presented to the ED after 3 episodes of total loss of vision lasting 2-10 minutes and lightheadedness with return back to baseline. Imaging demonstrated a 10mm ICA aneurysm. She was a long term smoker, but quit after her procedure. She does not have a family history of aneurysms. She elected to undergo cerebral angiography with endovascular embolization which was performed on 4/28/2023. She was subsequently placed on an aspirin and now returns for her 1 year angiographic follow-up preprocedure visit.  She reports no headaches, weakness or other neurologic deficit, but continues to have complaints of balance difficulty without evidence of a stroke on her MRI and her cardiac work-up was also unremarkable on 5/25/2023.  She has discontinued smoking and is no longer on an aspirin.    The following portions of the patient's history were reviewed and updated as appropriate: She  has a past medical history of Abnormal Pap smear of cervix and Aneurysm.  She does not have any pertinent problems on file.  She  has a past surgical history that includes Cervical biopsy w/ loop electrode excision.  Her family history is not on file.  She  reports that she has been smoking cigarettes. She does not have any smokeless tobacco history on file. She reports current alcohol use. She reports that she does not use drugs.  Current Outpatient Medications   Medication Sig Dispense Refill   • ALPRAZolam (XANAX) 0.5 MG tablet Take 1 tablet by mouth 2 (Two) Times a Day As Needed for Anxiety.     • sertraline (ZOLOFT) 100 MG tablet Take 1 tablet by mouth Daily.     • fluticasone (FLONASE) 50 MCG/ACT nasal spray 2 sprays into the nostril(s) as directed by provider Daily. (Patient not taking: Reported on 7/11/2023)      • Loratadine (CLARITIN PO) Take 1 tablet by mouth Daily. (Patient not taking: Reported on 7/11/2023)       No current facility-administered medications for this visit.     Current Outpatient Medications on File Prior to Visit   Medication Sig   • ALPRAZolam (XANAX) 0.5 MG tablet Take 1 tablet by mouth 2 (Two) Times a Day As Needed for Anxiety.   • sertraline (ZOLOFT) 100 MG tablet Take 1 tablet by mouth Daily.   • [DISCONTINUED] ALPRAZolam (XANAX) 0.5 MG tablet Take 1 tablet by mouth.   • [DISCONTINUED] aspirin 81 MG chewable tablet Chew 1 tablet Daily.   • fluticasone (FLONASE) 50 MCG/ACT nasal spray 2 sprays into the nostril(s) as directed by provider Daily. (Patient not taking: Reported on 7/11/2023)   • Loratadine (CLARITIN PO) Take 1 tablet by mouth Daily. (Patient not taking: Reported on 7/11/2023)     No current facility-administered medications on file prior to visit.     She is allergic to promethazine, escitalopram oxalate, oxycodone, and phenergan [promethazine hcl]..    Past Medical History:   Diagnosis Date   • Abnormal Pap smear of cervix    • Aneurysm         Past Surgical History:   Procedure Laterality Date   • CERVICAL BIOPSY  W/ LOOP ELECTRODE EXCISION            Current Outpatient Medications:   •  ALPRAZolam (XANAX) 0.5 MG tablet, Take 1 tablet by mouth 2 (Two) Times a Day As Needed for Anxiety., Disp: , Rfl:   •  sertraline (ZOLOFT) 100 MG tablet, Take 1 tablet by mouth Daily., Disp: , Rfl:   •  fluticasone (FLONASE) 50 MCG/ACT nasal spray, 2 sprays into the nostril(s) as directed by provider Daily. (Patient not taking: Reported on 7/11/2023), Disp: , Rfl:   •  Loratadine (CLARITIN PO), Take 1 tablet by mouth Daily. (Patient not taking: Reported on 7/11/2023), Disp: , Rfl:      Allergies   Allergen Reactions   • Promethazine Nausea And Vomiting   • Escitalopram Oxalate Other (See Comments)     Nausea and dizziness   • Oxycodone Itching   • Phenergan [Promethazine Hcl] GI Intolerance     "    Social History     Socioeconomic History   • Marital status:    Tobacco Use   • Smoking status: Every Day     Current packs/day: 0.25     Types: Cigarettes   Vaping Use   • Vaping status: Every Day   Substance and Sexual Activity   • Alcohol use: Yes     Comment: ocassional   • Drug use: No   • Sexual activity: Yes        History reviewed. No pertinent family history.     Review of Systems   Eyes:  Positive for visual disturbance (blurry vision).   Neurological:  Positive for dizziness and headaches. Negative for speech difficulty, light-headedness and numbness.   All other systems reviewed and are negative.      Objective     Vitals:    05/14/24 1137   BP: 116/70   Pulse: 64   Temp: 97.8 °F (36.6 °C)   SpO2: 98%   Weight: 64.4 kg (142 lb)   Height: 165.1 cm (65\")     Body mass index is 23.63 kg/m².      Physical Exam  Vitals and nursing note reviewed.   Constitutional:       General: She is not in acute distress.     Appearance: She is normal weight.   HENT:      Nose: Nose normal.      Mouth/Throat:      Mouth: Mucous membranes are moist.   Eyes:      Extraocular Movements: Extraocular movements intact.      Conjunctiva/sclera: Conjunctivae normal.   Cardiovascular:      Rate and Rhythm: Normal rate.   Pulmonary:      Effort: Pulmonary effort is normal.   Musculoskeletal:         General: Normal range of motion.      Cervical back: Normal range of motion.   Skin:     General: Skin is warm and dry.   Neurological:      General: No focal deficit present.      Mental Status: She is alert and oriented to person, place, and time.      Cranial Nerves: No cranial nerve deficit.      Sensory: No sensory deficit.      Motor: No weakness.      Coordination: Coordination normal.   Neurologic Exam     Mental Status   Oriented to person, place, and time.           Assessment & Plan   Independent Review of Radiographic Studies:      I personally reviewed the images from the following studies.    The cerebral " embolization dated 2023 was reviewed with the patient again and shows the successful WEB placement into the left ICA terminus aneurysm.  No additional intracranial aneurysm was appreciated.    Medical Decision Makin-year-old female with history of an intracranial aneurysm now status post WEB embolization and now due for her 1 year angiographic follow-up to confirm aneurysm occlusion.  Patient reports no new neurologic complaints and has quit her smoking.  She is no longer on an aspirin.  Cerebral angiography will be set up in the near future working with the patient's schedule.  Diagnoses and all orders for this visit:    1. Aneurysm of internal carotid artery (Primary)  -     IR Arch & 3 Vessel Head; Future  -     Basic Metabolic Panel; Future  -     CBC & Differential; Future      Return in about 3 months (around 2024) for Recheck, Cerebral Angiogram (DSA).

## 2024-05-14 ENCOUNTER — OFFICE VISIT (OUTPATIENT)
Dept: NEUROSURGERY | Facility: CLINIC | Age: 44
End: 2024-05-14

## 2024-05-14 VITALS
SYSTOLIC BLOOD PRESSURE: 116 MMHG | OXYGEN SATURATION: 98 % | DIASTOLIC BLOOD PRESSURE: 70 MMHG | HEART RATE: 64 BPM | BODY MASS INDEX: 23.66 KG/M2 | WEIGHT: 142 LBS | HEIGHT: 65 IN | TEMPERATURE: 97.8 F

## 2024-05-14 DIAGNOSIS — I67.1 ANEURYSM OF INTERNAL CAROTID ARTERY: Primary | ICD-10-CM

## 2024-05-14 PROCEDURE — 99214 OFFICE O/P EST MOD 30 MIN: CPT | Performed by: RADIOLOGY

## 2024-05-14 RX ORDER — ALPRAZOLAM 0.5 MG/1
0.5 TABLET ORAL
COMMUNITY
Start: 2024-04-29 | End: 2024-05-14 | Stop reason: SDUPTHER

## 2024-05-14 RX ORDER — SERTRALINE HYDROCHLORIDE 100 MG/1
100 TABLET, FILM COATED ORAL DAILY
COMMUNITY
Start: 2024-02-29 | End: 2024-06-28

## 2024-06-11 ENCOUNTER — LAB (OUTPATIENT)
Dept: LAB | Facility: HOSPITAL | Age: 44
End: 2024-06-11
Payer: MEDICAID

## 2024-06-11 DIAGNOSIS — I67.1 ANEURYSM OF INTERNAL CAROTID ARTERY: ICD-10-CM

## 2024-06-11 LAB
ANION GAP SERPL CALCULATED.3IONS-SCNC: 9.3 MMOL/L (ref 5–15)
BASOPHILS # BLD AUTO: 0.05 10*3/MM3 (ref 0–0.2)
BASOPHILS NFR BLD AUTO: 0.9 % (ref 0–1.5)
BUN SERPL-MCNC: 10 MG/DL (ref 6–20)
BUN/CREAT SERPL: 12.8 (ref 7–25)
CALCIUM SPEC-SCNC: 9.3 MG/DL (ref 8.6–10.5)
CHLORIDE SERPL-SCNC: 106 MMOL/L (ref 98–107)
CO2 SERPL-SCNC: 22.7 MMOL/L (ref 22–29)
CREAT SERPL-MCNC: 0.78 MG/DL (ref 0.57–1)
DEPRECATED RDW RBC AUTO: 42.1 FL (ref 37–54)
EGFRCR SERPLBLD CKD-EPI 2021: 96.8 ML/MIN/1.73
EOSINOPHIL # BLD AUTO: 0.18 10*3/MM3 (ref 0–0.4)
EOSINOPHIL NFR BLD AUTO: 3.3 % (ref 0.3–6.2)
ERYTHROCYTE [DISTWIDTH] IN BLOOD BY AUTOMATED COUNT: 12.4 % (ref 12.3–15.4)
GLUCOSE SERPL-MCNC: 86 MG/DL (ref 65–99)
HCT VFR BLD AUTO: 38 % (ref 34–46.6)
HGB BLD-MCNC: 12.6 G/DL (ref 12–15.9)
IMM GRANULOCYTES # BLD AUTO: 0.01 10*3/MM3 (ref 0–0.05)
IMM GRANULOCYTES NFR BLD AUTO: 0.2 % (ref 0–0.5)
LYMPHOCYTES # BLD AUTO: 1.93 10*3/MM3 (ref 0.7–3.1)
LYMPHOCYTES NFR BLD AUTO: 35 % (ref 19.6–45.3)
MCH RBC QN AUTO: 31 PG (ref 26.6–33)
MCHC RBC AUTO-ENTMCNC: 33.2 G/DL (ref 31.5–35.7)
MCV RBC AUTO: 93.4 FL (ref 79–97)
MONOCYTES # BLD AUTO: 0.45 10*3/MM3 (ref 0.1–0.9)
MONOCYTES NFR BLD AUTO: 8.2 % (ref 5–12)
NEUTROPHILS NFR BLD AUTO: 2.9 10*3/MM3 (ref 1.7–7)
NEUTROPHILS NFR BLD AUTO: 52.4 % (ref 42.7–76)
NRBC BLD AUTO-RTO: 0 /100 WBC (ref 0–0.2)
PLATELET # BLD AUTO: 252 10*3/MM3 (ref 140–450)
PMV BLD AUTO: 10.5 FL (ref 6–12)
POTASSIUM SERPL-SCNC: 4.1 MMOL/L (ref 3.5–5.2)
RBC # BLD AUTO: 4.07 10*6/MM3 (ref 3.77–5.28)
SODIUM SERPL-SCNC: 138 MMOL/L (ref 136–145)
WBC NRBC COR # BLD AUTO: 5.52 10*3/MM3 (ref 3.4–10.8)

## 2024-06-11 PROCEDURE — 80048 BASIC METABOLIC PNL TOTAL CA: CPT

## 2024-06-11 PROCEDURE — 36415 COLL VENOUS BLD VENIPUNCTURE: CPT

## 2024-06-11 PROCEDURE — 85025 COMPLETE CBC W/AUTO DIFF WBC: CPT

## 2024-06-12 ENCOUNTER — TELEPHONE (OUTPATIENT)
Dept: NEUROSURGERY | Facility: CLINIC | Age: 44
End: 2024-06-12
Payer: MEDICAID

## 2024-06-12 NOTE — TELEPHONE ENCOUNTER
Amrita from [facility] [phone] called to state patient has no ins. They have contacted pt for a deposit, but has been unable to talk to her.      Can this imaging be pushed out or is it emergent? Please call back asap

## 2024-06-13 NOTE — PROGRESS NOTES
06/14/24 0001   Pre-Procedure Phone Call   Procedure Time Verified Yes   Arrival Time 0700   Procedure Location Verified Yes   Medical History Reviewed Yes   NPO Status Reinforced Yes   Ride and Caregiver Arranged Yes   Phone Number for Ride/Caregiver Brenden Kay (spouse) - [phone]   Patient Knows to Bring Current Medications Yes   Bring Outside Films Requested No

## 2024-06-14 ENCOUNTER — HOSPITAL ENCOUNTER (OUTPATIENT)
Dept: INTERVENTIONAL RADIOLOGY/VASCULAR | Facility: HOSPITAL | Age: 44
Discharge: HOME | End: 2024-06-14
Payer: MEDICAID

## 2024-06-14 ENCOUNTER — ANESTHESIA (OUTPATIENT)
Dept: INTERVENTIONAL RADIOLOGY/VASCULAR | Facility: HOSPITAL | Age: 44
End: 2024-06-14
Payer: MEDICAID

## 2024-06-14 VITALS
HEIGHT: 64 IN | SYSTOLIC BLOOD PRESSURE: 98 MMHG | DIASTOLIC BLOOD PRESSURE: 62 MMHG | BODY MASS INDEX: 23.9 KG/M2 | WEIGHT: 140 LBS | RESPIRATION RATE: 16 BRPM | OXYGEN SATURATION: 97 % | TEMPERATURE: 97.7 F | HEART RATE: 67 BPM

## 2024-06-14 DIAGNOSIS — I67.1 ANEURYSM OF INTERNAL CAROTID ARTERY: ICD-10-CM

## 2024-06-14 LAB — HCG INTACT+B SERPL-ACNC: <1 MIU/ML

## 2024-06-14 PROCEDURE — C1760 CLOSURE DEV, VASC: HCPCS

## 2024-06-14 PROCEDURE — 76377 3D RENDER W/INTRP POSTPROCES: CPT

## 2024-06-14 PROCEDURE — 25010000002 HEPARIN (PORCINE) PER 1000 UNITS: Performed by: RADIOLOGY

## 2024-06-14 PROCEDURE — 25010000002 PROPOFOL 10 MG/ML EMULSION: Performed by: NURSE ANESTHETIST, CERTIFIED REGISTERED

## 2024-06-14 PROCEDURE — C1894 INTRO/SHEATH, NON-LASER: HCPCS

## 2024-06-14 PROCEDURE — 0 IODIXANOL PER 1 ML: Performed by: RADIOLOGY

## 2024-06-14 PROCEDURE — 25010000002 MIDAZOLAM PER 1 MG: Performed by: STUDENT IN AN ORGANIZED HEALTH CARE EDUCATION/TRAINING PROGRAM

## 2024-06-14 PROCEDURE — 25010000002 HYDROMORPHONE PER 4 MG: Performed by: NURSE ANESTHETIST, CERTIFIED REGISTERED

## 2024-06-14 PROCEDURE — C1769 GUIDE WIRE: HCPCS

## 2024-06-14 PROCEDURE — 25810000003 LACTATED RINGERS PER 1000 ML: Performed by: NURSE ANESTHETIST, CERTIFIED REGISTERED

## 2024-06-14 PROCEDURE — 25810000003 LACTATED RINGERS PER 1000 ML: Performed by: STUDENT IN AN ORGANIZED HEALTH CARE EDUCATION/TRAINING PROGRAM

## 2024-06-14 PROCEDURE — 84702 CHORIONIC GONADOTROPIN TEST: CPT | Performed by: RADIOLOGY

## 2024-06-14 RX ORDER — ONDANSETRON 2 MG/ML
4 INJECTION INTRAMUSCULAR; INTRAVENOUS ONCE AS NEEDED
Status: DISCONTINUED | OUTPATIENT
Start: 2024-06-14 | End: 2024-06-15 | Stop reason: HOSPADM

## 2024-06-14 RX ORDER — EPHEDRINE SULFATE 50 MG/ML
5 INJECTION, SOLUTION INTRAVENOUS ONCE AS NEEDED
Status: DISCONTINUED | OUTPATIENT
Start: 2024-06-14 | End: 2024-06-15 | Stop reason: HOSPADM

## 2024-06-14 RX ORDER — FLUMAZENIL 0.1 MG/ML
0.2 INJECTION INTRAVENOUS AS NEEDED
Status: DISCONTINUED | OUTPATIENT
Start: 2024-06-14 | End: 2024-06-15 | Stop reason: HOSPADM

## 2024-06-14 RX ORDER — FAMOTIDINE 10 MG/ML
20 INJECTION, SOLUTION INTRAVENOUS ONCE
Status: COMPLETED | OUTPATIENT
Start: 2024-06-14 | End: 2024-06-14

## 2024-06-14 RX ORDER — DIPHENHYDRAMINE HYDROCHLORIDE 50 MG/ML
12.5 INJECTION INTRAMUSCULAR; INTRAVENOUS
Status: DISCONTINUED | OUTPATIENT
Start: 2024-06-14 | End: 2024-06-15 | Stop reason: HOSPADM

## 2024-06-14 RX ORDER — SODIUM CHLORIDE, SODIUM LACTATE, POTASSIUM CHLORIDE, CALCIUM CHLORIDE 600; 310; 30; 20 MG/100ML; MG/100ML; MG/100ML; MG/100ML
INJECTION, SOLUTION INTRAVENOUS CONTINUOUS PRN
Status: DISCONTINUED | OUTPATIENT
Start: 2024-06-14 | End: 2024-06-14 | Stop reason: SURG

## 2024-06-14 RX ORDER — HYDROMORPHONE HYDROCHLORIDE 1 MG/ML
0.5 INJECTION, SOLUTION INTRAMUSCULAR; INTRAVENOUS; SUBCUTANEOUS
Status: DISCONTINUED | OUTPATIENT
Start: 2024-06-14 | End: 2024-06-15 | Stop reason: HOSPADM

## 2024-06-14 RX ORDER — PHENYLEPHRINE HCL IN 0.9% NACL 1 MG/10 ML
SYRINGE (ML) INTRAVENOUS AS NEEDED
Status: DISCONTINUED | OUTPATIENT
Start: 2024-06-14 | End: 2024-06-14 | Stop reason: SURG

## 2024-06-14 RX ORDER — PROMETHAZINE HYDROCHLORIDE 25 MG/1
25 SUPPOSITORY RECTAL ONCE AS NEEDED
Status: DISCONTINUED | OUTPATIENT
Start: 2024-06-14 | End: 2024-06-15 | Stop reason: HOSPADM

## 2024-06-14 RX ORDER — DROPERIDOL 2.5 MG/ML
0.62 INJECTION, SOLUTION INTRAMUSCULAR; INTRAVENOUS
Status: DISCONTINUED | OUTPATIENT
Start: 2024-06-14 | End: 2024-06-15 | Stop reason: HOSPADM

## 2024-06-14 RX ORDER — FENTANYL CITRATE 50 UG/ML
50 INJECTION, SOLUTION INTRAMUSCULAR; INTRAVENOUS ONCE AS NEEDED
Status: DISCONTINUED | OUTPATIENT
Start: 2024-06-14 | End: 2024-06-15 | Stop reason: HOSPADM

## 2024-06-14 RX ORDER — IPRATROPIUM BROMIDE AND ALBUTEROL SULFATE 2.5; .5 MG/3ML; MG/3ML
3 SOLUTION RESPIRATORY (INHALATION) ONCE AS NEEDED
Status: DISCONTINUED | OUTPATIENT
Start: 2024-06-14 | End: 2024-06-15 | Stop reason: HOSPADM

## 2024-06-14 RX ORDER — SODIUM CHLORIDE 0.9 % (FLUSH) 0.9 %
3-10 SYRINGE (ML) INJECTION AS NEEDED
Status: DISCONTINUED | OUTPATIENT
Start: 2024-06-14 | End: 2024-06-15 | Stop reason: HOSPADM

## 2024-06-14 RX ORDER — HYDRALAZINE HYDROCHLORIDE 20 MG/ML
5 INJECTION INTRAMUSCULAR; INTRAVENOUS
Status: DISCONTINUED | OUTPATIENT
Start: 2024-06-14 | End: 2024-06-15 | Stop reason: HOSPADM

## 2024-06-14 RX ORDER — LIDOCAINE HYDROCHLORIDE 10 MG/ML
0.5 INJECTION, SOLUTION INFILTRATION; PERINEURAL ONCE AS NEEDED
Status: DISCONTINUED | OUTPATIENT
Start: 2024-06-14 | End: 2024-06-15 | Stop reason: HOSPADM

## 2024-06-14 RX ORDER — SODIUM CHLORIDE 0.9 % (FLUSH) 0.9 %
3 SYRINGE (ML) INJECTION EVERY 12 HOURS SCHEDULED
Status: DISCONTINUED | OUTPATIENT
Start: 2024-06-14 | End: 2024-06-15 | Stop reason: HOSPADM

## 2024-06-14 RX ORDER — IODIXANOL 320 MG/ML
200 INJECTION, SOLUTION INTRAVASCULAR
Status: COMPLETED | OUTPATIENT
Start: 2024-06-14 | End: 2024-06-14

## 2024-06-14 RX ORDER — PROMETHAZINE HYDROCHLORIDE 25 MG/1
25 TABLET ORAL ONCE AS NEEDED
Status: DISCONTINUED | OUTPATIENT
Start: 2024-06-14 | End: 2024-06-15 | Stop reason: HOSPADM

## 2024-06-14 RX ORDER — LABETALOL HYDROCHLORIDE 5 MG/ML
5 INJECTION, SOLUTION INTRAVENOUS
Status: DISCONTINUED | OUTPATIENT
Start: 2024-06-14 | End: 2024-06-15 | Stop reason: HOSPADM

## 2024-06-14 RX ORDER — HYDROCODONE BITARTRATE AND ACETAMINOPHEN 5; 325 MG/1; MG/1
1 TABLET ORAL ONCE AS NEEDED
Status: DISCONTINUED | OUTPATIENT
Start: 2024-06-14 | End: 2024-06-15 | Stop reason: HOSPADM

## 2024-06-14 RX ORDER — MIDAZOLAM HYDROCHLORIDE 1 MG/ML
1 INJECTION INTRAMUSCULAR; INTRAVENOUS
Status: COMPLETED | OUTPATIENT
Start: 2024-06-14 | End: 2024-06-14

## 2024-06-14 RX ORDER — NALOXONE HCL 0.4 MG/ML
0.2 VIAL (ML) INJECTION AS NEEDED
Status: DISCONTINUED | OUTPATIENT
Start: 2024-06-14 | End: 2024-06-15 | Stop reason: HOSPADM

## 2024-06-14 RX ORDER — FENTANYL CITRATE 50 UG/ML
50 INJECTION, SOLUTION INTRAMUSCULAR; INTRAVENOUS
Status: DISCONTINUED | OUTPATIENT
Start: 2024-06-14 | End: 2024-06-15 | Stop reason: HOSPADM

## 2024-06-14 RX ORDER — SODIUM CHLORIDE, SODIUM LACTATE, POTASSIUM CHLORIDE, CALCIUM CHLORIDE 600; 310; 30; 20 MG/100ML; MG/100ML; MG/100ML; MG/100ML
9 INJECTION, SOLUTION INTRAVENOUS CONTINUOUS
Status: DISCONTINUED | OUTPATIENT
Start: 2024-06-14 | End: 2024-06-15 | Stop reason: HOSPADM

## 2024-06-14 RX ORDER — LIDOCAINE HYDROCHLORIDE 10 MG/ML
INJECTION, SOLUTION EPIDURAL; INFILTRATION; INTRACAUDAL; PERINEURAL AS NEEDED
Status: COMPLETED | OUTPATIENT
Start: 2024-06-14 | End: 2024-06-14

## 2024-06-14 RX ADMIN — SODIUM CHLORIDE, POTASSIUM CHLORIDE, SODIUM LACTATE AND CALCIUM CHLORIDE: 600; 310; 30; 20 INJECTION, SOLUTION INTRAVENOUS at 08:37

## 2024-06-14 RX ADMIN — HEPARIN SODIUM: 1000 INJECTION INTRAVENOUS; SUBCUTANEOUS at 09:02

## 2024-06-14 RX ADMIN — HYDROMORPHONE HYDROCHLORIDE 0.5 MG: 1 INJECTION, SOLUTION INTRAMUSCULAR; INTRAVENOUS; SUBCUTANEOUS at 10:23

## 2024-06-14 RX ADMIN — LIDOCAINE HYDROCHLORIDE 9 ML: 10 INJECTION, SOLUTION EPIDURAL; INFILTRATION; INTRACAUDAL; PERINEURAL at 09:00

## 2024-06-14 RX ADMIN — SODIUM CHLORIDE, POTASSIUM CHLORIDE, SODIUM LACTATE AND CALCIUM CHLORIDE 9 ML/HR: 600; 310; 30; 20 INJECTION, SOLUTION INTRAVENOUS at 08:32

## 2024-06-14 RX ADMIN — IODIXANOL 80 ML: 320 INJECTION, SOLUTION INTRAVASCULAR at 09:30

## 2024-06-14 RX ADMIN — HEPARIN SODIUM: 1000 INJECTION INTRAVENOUS; SUBCUTANEOUS at 09:01

## 2024-06-14 RX ADMIN — MIDAZOLAM 1 MG: 1 INJECTION INTRAMUSCULAR; INTRAVENOUS at 08:25

## 2024-06-14 RX ADMIN — MIDAZOLAM 1 MG: 1 INJECTION INTRAMUSCULAR; INTRAVENOUS at 08:34

## 2024-06-14 RX ADMIN — Medication 100 MCG: at 08:59

## 2024-06-14 RX ADMIN — PROPOFOL 125 MCG/KG/MIN: 10 INJECTION, EMULSION INTRAVENOUS at 08:45

## 2024-06-14 RX ADMIN — FAMOTIDINE 20 MG: 10 INJECTION INTRAVENOUS at 08:32

## 2024-06-14 NOTE — DISCHARGE INSTRUCTIONS
POSTOPERATIVE CARE INSTRUCTIONS FOR CEREBRAL ANGIOGRAPHY  (Femoral/ Groin Approach)  These instructions provide you with information on caring for yourself after your procedure. Your health care provider may also give you more specific instructions. Your treatment has been planned according to current medical practices, but problems sometimes occur. Call your health care provider if you have any problems or questions after your procedure    Home Care Instructions:  After your angiogram, you will need to be less active than normal. you should plan to be off work and relax for the next two days. This is because your doctor has placed a plug in your artery to close it up, but it still needs some time to heal.   Avoid strenuous activity and heavy lifting for 1 week or as advised by your physician. Heavy lifting is considered anything over 10 pounds. A gallon of milk weighs 8 pounds. If you have to hold anything more than 10 pounds (such as a baby), have someone place that object in your lap or arms.    Do not submerge the affected site in water until the site is completely healed. You may shower 24 hours after the procedure. Remove the bandage (dressing) and gently wash the site with plain soap and water. Gently pat the site dry. You may apply a band aid daily for 2 days if desired  Hold direct pressure over the groin site(s) when you cough, sneeze, laugh or change positions.  Do this for the next 2 days.    Inspect the site at least twice daily.    Increase your fluid intake over the next 2 days in order to flush it out of your body. Water and juice are the best choices. If you need to drink soda, coffee or tea, choose decaffeinated. Caffeine will dehydrate you, and it will take you longer to flush out the dye from your body. You will know you are getting enough fluids if your urine is clear or very pale yellow.  Metformin or any medications containing Metformin should not be taken for 48 hours after your  procedure.  Do not operate machinery or power tools for 24 hours.  A responsible adult should be with you for the first 24 hours after you arrive home. Do not make any important legal decisions or sign legal papers for 24 hours.  Do not drink alcohol for 24 hours.  Keep all follow-up visits as directed by your health care provider.         Call Your Doctor If:  You have unusual pain at the groin site.  You have redness, warmth, swelling, or pain at the groin site.  You have drainage (other than a small amount of blood on the dressing).  You have chills or a fever > 101.  Your leg(s) becomes pale or dark, cool, tingly, or numb.  You develop chest pain, shortness of breath, feel faint or pass out.    You have heavy bleeding from the site.  If you do, hold pressure on the site for 20 minutes.  If the bleeding stops, apply a fresh bandage and call your surgeon.  However, if you continue to have bleeding, maintain pressure and call 911.    You have any symptoms of a stroke.  Remember BE FAST  B-balance. Sudden trouble walking or loss of balance.  E-eyes.  Sudden changes in how you see or a sudden onset of a very bad headache.   F-face. Sudden weakness or loss of feeling of the face or facial droop on one side.   A-arms Sudden weakness or numbness in one arm. One arm drifts down if they are both held out in front of you. This happens suddenly and usually on one side of the body.   S-speech.  Sudden trouble speaking, slurred speech or trouble understanding what people are saying.   T-time to call emergency services.  Write down the symptoms and the time they started.

## 2024-06-14 NOTE — ANESTHESIA POSTPROCEDURE EVALUATION
"Patient: Oliva Flores    Procedure Summary       Date: 06/14/24 Room / Location: [facility] INTERVENTIONAL    Anesthesia Start: 0837 Anesthesia Stop: 0939    Procedure: IR ARCH AND 3 VESSEL HEAD Diagnosis:       Aneurysm of internal carotid artery      (Cerebral aneurysm follow-up)    Scheduled Providers:  Provider: Campbell Cline MD    Anesthesia Type: MAC ASA Status: 3            Anesthesia Type: MAC    Vitals  No vitals data found for the desired time range.          Post Anesthesia Care and Evaluation    Patient location during evaluation: PACU  Patient participation: complete - patient participated  Level of consciousness: awake and alert  Pain management: adequate    Airway patency: patent  Anesthetic complications: No anesthetic complications  PONV Status: controlled  Cardiovascular status: acceptable and hemodynamically stable  Respiratory status: acceptable  Hydration status: acceptable    Comments: /72 (BP Location: Left arm, Patient Position: Lying)   Pulse 75   Temp 36.3 °C (97.3 °F) (Oral)   Resp 18   Ht 162.6 cm (64\")   Wt 63.5 kg (140 lb)   SpO2 99%   BMI 24.03 kg/m²     "

## 2024-06-14 NOTE — ANESTHESIA PREPROCEDURE EVALUATION
Anesthesia Evaluation     Patient summary reviewed and Nursing notes reviewed   no history of anesthetic complications:   NPO Solid Status: > 8 hours  NPO Liquid Status: > 8 hours           Airway   Mallampati: II  TM distance: >3 FB  Neck ROM: full  Comment: Mask Difficulty Assessment: 1 - vent by mask  Final Endotracheal Airway: ETT  Cuffed: Yes  Cormack-Lehane Classification: grade I - full view of glottis  Technique Used For Successful Placement: direct laryngoscopy  Insertion Site: oral  Blade Type: Gross  Blade Size: 2  ETT Size (mm): 7.5      Dental - normal exam     Pulmonary    (+) a smoker Current, Abstained day of surgery,  (-) asthma, sleep apnea  Cardiovascular   Exercise tolerance: good (4-7 METS)    (+)  carotid artery disease (L ICA anyeursm ) left carotid  (-) hypertension, CAD, dysrhythmias, angina, HUNTER      Neuro/Psych  (-) seizures, CVA    ROS Comment: Recent transient vision loss  GI/Hepatic/Renal/Endo    (-) liver disease, no renal disease, diabetes, no thyroid disorder    Musculoskeletal     Abdominal    Substance History      OB/GYN          Other        ROS/Med Hx Other: status post WEB embolization                    Anesthesia Plan    ASA 3     MAC   total IV anesthesia  (I have reviewed the patient's history with the patient and the chart, including all pertinent laboratory results and imaging. I have explained the risks of anesthesia including but not limited to dental damage, corneal abrasion, nerve injury, MI, stroke, and death. Questions asked and answered. Anesthetic plan discussed with patient and team as indicated. Patient expressed understanding of the above.)    Anesthetic plan, risks, benefits, and alternatives have been provided, discussed and informed consent has been obtained with: patient.        CODE STATUS:

## 2024-06-14 NOTE — POST-PROCEDURE NOTE
POST PROCEDURE NOTE    Procedure: Cerebral DSA    Pre-Procedure Diagnosis: LICA Terminus Aneurysm S/P WEB Embolization    Post-procedure Diagnosis: Same    Findings: Complete WEB occlusion of the aneurysm with typical WEB indentation opacification seen.    Complications: None encountered    Blood loss: 10 cc    Specimen Removed: None    Disposition:   Discharge home today

## 2024-06-14 NOTE — H&P
CC: Cerebral Aneurysm    History of Present Illness: Oliva Flores is a 44 y.o. female is here today for follow-up pre-angio. Hx of Aneurysm.       History of Present Illness  44 y.o. female with no past medical history of significance who presented to the ED after 3 episodes of total loss of vision lasting 2-10 minutes and lightheadedness with return back to baseline. Imaging demonstrated a 10mm ICA aneurysm. She was a long term smoker, but quit after her procedure. She does not have a family history of aneurysms. She elected to undergo cerebral angiography with endovascular embolization which was performed on 4/28/2023. She was subsequently placed on an aspirin and now returns for her 1 year angiographic follow-up preprocedure visit.  She reports no headaches, weakness or other neurologic deficit, but continues to have complaints of balance difficulty without evidence of a stroke on her MRI and her cardiac work-up was also unremarkable on 5/25/2023.  She has discontinued smoking and is no longer on an aspirin.     The following portions of the patient's history were reviewed and updated as appropriate: She  has a past medical history of Abnormal Pap smear of cervix and Aneurysm.  She does not have any pertinent problems on file.  She  has a past surgical history that includes Cervical biopsy w/ loop electrode excision.  Her family history is not on file.  She  reports that she has been smoking cigarettes. She does not have any smokeless tobacco history on file. She reports current alcohol use. She reports that she does not use drugs.  Current Medications          Current Outpatient Medications   Medication Sig Dispense Refill    ALPRAZolam (XANAX) 0.5 MG tablet Take 1 tablet by mouth 2 (Two) Times a Day As Needed for Anxiety.        sertraline (ZOLOFT) 100 MG tablet Take 1 tablet by mouth Daily.        fluticasone (FLONASE) 50 MCG/ACT nasal spray 2 sprays into the nostril(s) as directed by provider Daily.  (Patient not taking: Reported on 7/11/2023)        Loratadine (CLARITIN PO) Take 1 tablet by mouth Daily. (Patient not taking: Reported on 7/11/2023)          No current facility-administered medications for this visit.         Medications Ordered Prior to Encounter        Current Outpatient Medications on File Prior to Visit   Medication Sig    ALPRAZolam (XANAX) 0.5 MG tablet Take 1 tablet by mouth 2 (Two) Times a Day As Needed for Anxiety.    sertraline (ZOLOFT) 100 MG tablet Take 1 tablet by mouth Daily.    [DISCONTINUED] ALPRAZolam (XANAX) 0.5 MG tablet Take 1 tablet by mouth.    [DISCONTINUED] aspirin 81 MG chewable tablet Chew 1 tablet Daily.    fluticasone (FLONASE) 50 MCG/ACT nasal spray 2 sprays into the nostril(s) as directed by provider Daily. (Patient not taking: Reported on 7/11/2023)    Loratadine (CLARITIN PO) Take 1 tablet by mouth Daily. (Patient not taking: Reported on 7/11/2023)      No current facility-administered medications on file prior to visit.         She is allergic to promethazine, escitalopram oxalate, oxycodone, and phenergan [promethazine hcl]..     Medical History        Past Medical History:   Diagnosis Date    Abnormal Pap smear of cervix      Aneurysm              Surgical History         Past Surgical History:   Procedure Laterality Date    CERVICAL BIOPSY  W/ LOOP ELECTRODE EXCISION                  Current Medications      Current Outpatient Medications:     ALPRAZolam (XANAX) 0.5 MG tablet, Take 1 tablet by mouth 2 (Two) Times a Day As Needed for Anxiety., Disp: , Rfl:     sertraline (ZOLOFT) 100 MG tablet, Take 1 tablet by mouth Daily., Disp: , Rfl:     fluticasone (FLONASE) 50 MCG/ACT nasal spray, 2 sprays into the nostril(s) as directed by provider Daily. (Patient not taking: Reported on 7/11/2023), Disp: , Rfl:     Loratadine (CLARITIN PO), Take 1 tablet by mouth Daily. (Patient not taking: Reported on 7/11/2023), Disp: , Rfl:          Allergies         Allergies  "  Allergen Reactions    Promethazine Nausea And Vomiting    Escitalopram Oxalate Other (See Comments)       Nausea and dizziness    Oxycodone Itching    Phenergan [Promethazine Hcl] GI Intolerance            Social History   Social History            Socioeconomic History    Marital status:    Tobacco Use    Smoking status: Every Day       Current packs/day: 0.25       Types: Cigarettes   Vaping Use    Vaping status: Every Day   Substance and Sexual Activity    Alcohol use: Yes       Comment: ocassional    Drug use: No    Sexual activity: Yes            History reviewed. No pertinent family history.      Review of Systems   Eyes:  Positive for visual disturbance (blurry vision).   Neurological:  Positive for dizziness and headaches. Negative for speech difficulty, light-headedness and numbness.   All other systems reviewed and are negative.              Objective  Vitals        Vitals:     05/14/24 1137   BP: 116/70   Pulse: 64   Temp: 97.8 °F (36.6 °C)   SpO2: 98%   Weight: 64.4 kg (142 lb)   Height: 165.1 cm (65\")         Body mass index is 23.63 kg/m².        Physical Exam  Vitals and nursing note reviewed.   Constitutional:       General: She is not in acute distress.     Appearance: She is normal weight.   HENT:      Nose: Nose normal.      Mouth/Throat:      Mouth: Mucous membranes are moist.   Eyes:      Extraocular Movements: Extraocular movements intact.      Conjunctiva/sclera: Conjunctivae normal.   Cardiovascular:      Rate and Rhythm: Normal rate.   Pulmonary:      Effort: Pulmonary effort is normal.   Musculoskeletal:         General: Normal range of motion.      Cervical back: Normal range of motion.   Skin:     General: Skin is warm and dry.   Neurological:      General: No focal deficit present.      Mental Status: She is alert and oriented to person, place, and time.      Cranial Nerves: No cranial nerve deficit.      Sensory: No sensory deficit.      Motor: No weakness.      Coordination: " Coordination normal.   Neurologic Exam      Mental Status   Oriented to person, place, and time.                     Assessment & Plan  Independent Review of Radiographic Studies:      I personally reviewed the images from the following studies.     The cerebral embolization dated 2023 was reviewed with the patient again and shows the successful WEB placement into the left ICA terminus aneurysm.  No additional intracranial aneurysm was appreciated.     Medical Decision Makin-year-old female with history of an intracranial aneurysm now status post WEB embolization and now due for her 1 year angiographic follow-up to confirm aneurysm occlusion.  Patient reports no new neurologic complaints and has quit her smoking.  She is no longer on an aspirin.  Cerebral angiography will be set up in the near future working with the patient's schedule.  Diagnoses and all orders for this visit:     1. Aneurysm of internal carotid artery (Primary)  -     IR Arch & 3 Vessel Head; Future  -     Basic Metabolic Panel; Future  -     CBC & Differential; Future        Presents today for Recheck, Cerebral Angiogram (DSA).

## 2024-06-24 NOTE — PROGRESS NOTES
Subjective   Patient ID: Oliva Flores is a 44 y.o. female is here today for a PO follow up for cerebral angiogram. IR Arch and vessel head was completed on 6-.    History of Present Illness  44 y.o. female with no past medical history of significance who presented to the ED after 3 episodes of total loss of vision lasting 2-10 minutes and lightheadedness with return back to baseline. Imaging demonstrated a 10mm ICA aneurysm. She was a long term smoker, but quit after her procedure. She does not have a family history of aneurysms. She elected to undergo cerebral angiography with endovascular embolization which was performed on 4/28/2023. She was subsequently placed on an aspirin and returned on 6/14/2024 for her 1 year angiographic follow-up.  She reports no headaches, weakness or other neurologic deficit and had no complications from her angiographic procedure.  She has discontinued smoking and is no longer on an aspirin.  She returns today to review her angiographic follow-up findings with no new neurologic complaints.    The following portions of the patient's history were reviewed and updated as appropriate: She  has a past medical history of Abnormal Pap smear of cervix and Aneurysm.  She does not have any pertinent problems on file.  She  has a past surgical history that includes Cervical biopsy w/ loop electrode excision.  Her family history is not on file.  She  reports that she has been smoking cigarettes. She does not have any smokeless tobacco history on file. She reports current alcohol use. She reports that she does not use drugs.  Current Outpatient Medications   Medication Sig Dispense Refill    ALPRAZolam (XANAX) 0.5 MG tablet Take 0.5 tablets by mouth 2 (Two) Times a Day As Needed for Anxiety.      sertraline (ZOLOFT) 100 MG tablet Take 1 tablet by mouth Daily.       No current facility-administered medications for this visit.     Current Outpatient Medications on File Prior to Visit  "  Medication Sig    ALPRAZolam (XANAX) 0.5 MG tablet Take 0.5 tablets by mouth 2 (Two) Times a Day As Needed for Anxiety.    sertraline (ZOLOFT) 100 MG tablet Take 1 tablet by mouth Daily.     No current facility-administered medications on file prior to visit.     She is allergic to promethazine, escitalopram oxalate, oxycodone, and phenergan [promethazine hcl]..    Review of Systems   Musculoskeletal:  Negative for neck pain and neck stiffness.   Neurological:  Positive for headaches (occasional). Negative for dizziness and light-headedness.   All other systems reviewed and are negative.    Objective     Vitals:    07/02/24 1425   BP: 120/80   Pulse: 66   Temp: 98.2 °F (36.8 °C)   SpO2: 99%   Weight: 63 kg (139 lb)   Height: 162.3 cm (63.9\")     Body mass index is 23.94 kg/m².      Physical Exam  Vitals and nursing note reviewed.   Constitutional:       General: She is not in acute distress.     Appearance: Normal appearance.   Eyes:      Extraocular Movements: Extraocular movements intact.   Cardiovascular:      Rate and Rhythm: Normal rate.   Pulmonary:      Effort: Pulmonary effort is normal.   Musculoskeletal:         General: Normal range of motion.      Cervical back: Normal range of motion.   Skin:     General: Skin is warm and dry.   Neurological:      General: No focal deficit present.      Mental Status: She is alert and oriented to person, place, and time.      Cranial Nerves: No cranial nerve deficit.      Sensory: No sensory deficit.      Motor: No weakness.      Coordination: Coordination normal.   Neurologic Exam     Mental Status   Oriented to person, place, and time.           Assessment & Plan   Independent Review of Radiographic Studies:      I personally reviewed the images from the following studies.    The cerebral angiogram dated 6/14/2024 was reviewed with the patient and compared to the preembolization study of 4/28/2023 and shows successful WEB embolization now in follow-up with complete " aneurysm resolution taking into account the normal invagination of a WEB device.    Medical Decision Makin-year-old female with successful Web embolization of a left ICA terminus aneurysm on 2023 who underwent follow-up angiography on 2024 with resolution of the aneurysm.  Patient will return in 1 year for a surveillance MRA of the head.  Patient has discontinued smoking and has no current additional aneurysm risk factors.  Patient is to notify us should she develop any new neurologic symptoms, but is not needing to remain on any antiplatelet agents.  She was again reminded to remain tobacco free.  Diagnoses and all orders for this visit:    1. Aneurysm of internal carotid artery (Primary)      Return in about 1 year (around 2025) for Recheck, MRA Head.

## 2024-07-02 ENCOUNTER — OFFICE VISIT (OUTPATIENT)
Dept: NEUROSURGERY | Facility: CLINIC | Age: 44
End: 2024-07-02
Payer: MEDICAID

## 2024-07-02 ENCOUNTER — TELEPHONE (OUTPATIENT)
Dept: NEUROSURGERY | Facility: CLINIC | Age: 44
End: 2024-07-02

## 2024-07-02 VITALS
TEMPERATURE: 98.2 F | OXYGEN SATURATION: 99 % | HEIGHT: 64 IN | SYSTOLIC BLOOD PRESSURE: 120 MMHG | DIASTOLIC BLOOD PRESSURE: 80 MMHG | HEART RATE: 66 BPM | BODY MASS INDEX: 23.73 KG/M2 | WEIGHT: 139 LBS

## 2024-07-02 DIAGNOSIS — I67.1 ANEURYSM OF INTERNAL CAROTID ARTERY: Primary | ICD-10-CM

## 2024-07-02 PROCEDURE — 99213 OFFICE O/P EST LOW 20 MIN: CPT | Performed by: RADIOLOGY

## 2025-03-18 ENCOUNTER — TELEPHONE (OUTPATIENT)
Dept: NEUROSURGERY | Facility: CLINIC | Age: 45
End: 2025-03-18

## 2025-03-18 NOTE — TELEPHONE ENCOUNTER
I called patient regarding scheduled MRA scan 3/20/25. [facility] has been trying to reach her for payment to have imaging completed but has been unsuccessful. She will need to call them to have imaging rescheduled and to set up payment. Left .

## 2025-03-20 ENCOUNTER — HOSPITAL ENCOUNTER (OUTPATIENT)
Dept: MRI IMAGING | Facility: HOSPITAL | Age: 45
Discharge: HOME | End: 2025-03-20
Admitting: RADIOLOGY

## 2025-03-20 DIAGNOSIS — I67.1 ANEURYSM OF INTERNAL CAROTID ARTERY: ICD-10-CM

## 2025-03-20 PROCEDURE — 70546 MR ANGIOGRAPH HEAD W/O&W/DYE: CPT

## 2025-03-20 PROCEDURE — 25510000002 GADOBENATE DIMEGLUMINE 529 MG/ML SOLUTION: Performed by: RADIOLOGY

## 2025-03-20 PROCEDURE — A9577 INJ MULTIHANCE: HCPCS | Performed by: RADIOLOGY

## 2025-03-20 RX ADMIN — GADOBENATE DIMEGLUMINE 15 ML: 529 INJECTION, SOLUTION INTRAVENOUS at 16:53

## 2025-04-08 ENCOUNTER — APPOINTMENT (OUTPATIENT)
Dept: GENERAL RADIOLOGY | Facility: HOSPITAL | Age: 45
End: 2025-04-08

## 2025-04-08 ENCOUNTER — HOSPITAL ENCOUNTER (EMERGENCY)
Facility: HOSPITAL | Age: 45
Discharge: HOME | End: 2025-04-09
Attending: EMERGENCY MEDICINE | Admitting: EMERGENCY MEDICINE

## 2025-04-08 DIAGNOSIS — B97.81 HUMAN METAPNEUMOVIRUS AS THE CAUSE OF DISEASES CLASSIFIED ELSEWHERE: ICD-10-CM

## 2025-04-08 DIAGNOSIS — B34.9 VIRAL ILLNESS: Primary | ICD-10-CM

## 2025-04-08 LAB
B PARAPERT DNA SPEC QL NAA+PROBE: NOT DETECTED
B PERT DNA SPEC QL NAA+PROBE: NOT DETECTED
C PNEUM DNA NPH QL NAA+NON-PROBE: NOT DETECTED
FLUAV SUBTYP SPEC NAA+PROBE: NOT DETECTED
FLUBV RNA ISLT QL NAA+PROBE: NOT DETECTED
HADV DNA SPEC NAA+PROBE: NOT DETECTED
HCOV 229E RNA SPEC QL NAA+PROBE: NOT DETECTED
HCOV HKU1 RNA SPEC QL NAA+PROBE: NOT DETECTED
HCOV NL63 RNA SPEC QL NAA+PROBE: NOT DETECTED
HCOV OC43 RNA SPEC QL NAA+PROBE: NOT DETECTED
HMPV RNA NPH QL NAA+NON-PROBE: DETECTED
HPIV1 RNA ISLT QL NAA+PROBE: NOT DETECTED
HPIV2 RNA SPEC QL NAA+PROBE: NOT DETECTED
HPIV3 RNA NPH QL NAA+PROBE: NOT DETECTED
HPIV4 P GENE NPH QL NAA+PROBE: NOT DETECTED
M PNEUMO IGG SER IA-ACNC: NOT DETECTED
RHINOVIRUS RNA SPEC NAA+PROBE: NOT DETECTED
RSV RNA NPH QL NAA+NON-PROBE: NOT DETECTED
SARS-COV-2 RNA NPH QL NAA+NON-PROBE: NOT DETECTED

## 2025-04-08 PROCEDURE — 71046 X-RAY EXAM CHEST 2 VIEWS: CPT

## 2025-04-08 PROCEDURE — 99283 EMERGENCY DEPT VISIT LOW MDM: CPT

## 2025-04-08 PROCEDURE — 0202U NFCT DS 22 TRGT SARS-COV-2: CPT | Performed by: EMERGENCY MEDICINE

## 2025-04-08 RX ORDER — SODIUM CHLORIDE 0.9 % (FLUSH) 0.9 %
10 SYRINGE (ML) INJECTION AS NEEDED
Status: DISCONTINUED | OUTPATIENT
Start: 2025-04-08 | End: 2025-04-09 | Stop reason: HOSPADM

## 2025-04-08 RX ORDER — ONDANSETRON 2 MG/ML
4 INJECTION INTRAMUSCULAR; INTRAVENOUS ONCE
Status: COMPLETED | OUTPATIENT
Start: 2025-04-09 | End: 2025-04-09

## 2025-04-09 VITALS
TEMPERATURE: 98.4 F | SYSTOLIC BLOOD PRESSURE: 99 MMHG | HEART RATE: 67 BPM | RESPIRATION RATE: 18 BRPM | BODY MASS INDEX: 23.71 KG/M2 | OXYGEN SATURATION: 98 % | DIASTOLIC BLOOD PRESSURE: 59 MMHG | WEIGHT: 138.89 LBS | HEIGHT: 64 IN

## 2025-04-09 LAB
ALBUMIN SERPL-MCNC: 4 G/DL (ref 3.5–5.2)
ALBUMIN/GLOB SERPL: 1.3 G/DL
ALP SERPL-CCNC: 47 U/L (ref 39–117)
ALT SERPL W P-5'-P-CCNC: 39 U/L (ref 1–33)
ANION GAP SERPL CALCULATED.3IONS-SCNC: 8 MMOL/L (ref 5–15)
AST SERPL-CCNC: 48 U/L (ref 1–32)
BASOPHILS # BLD AUTO: 0.06 10*3/MM3 (ref 0–0.2)
BASOPHILS NFR BLD AUTO: 0.9 % (ref 0–1.5)
BILIRUB SERPL-MCNC: 0.3 MG/DL (ref 0–1.2)
BUN SERPL-MCNC: 7 MG/DL (ref 6–20)
BUN/CREAT SERPL: 7.6 (ref 7–25)
CALCIUM SPEC-SCNC: 9.1 MG/DL (ref 8.6–10.5)
CHLORIDE SERPL-SCNC: 105 MMOL/L (ref 98–107)
CO2 SERPL-SCNC: 20 MMOL/L (ref 22–29)
CREAT SERPL-MCNC: 0.92 MG/DL (ref 0.57–1)
DEPRECATED RDW RBC AUTO: 44.8 FL (ref 37–54)
EGFRCR SERPLBLD CKD-EPI 2021: 78.9 ML/MIN/1.73
EOSINOPHIL # BLD AUTO: 0.02 10*3/MM3 (ref 0–0.4)
EOSINOPHIL NFR BLD AUTO: 0.3 % (ref 0.3–6.2)
ERYTHROCYTE [DISTWIDTH] IN BLOOD BY AUTOMATED COUNT: 13 % (ref 12.3–15.4)
GLOBULIN UR ELPH-MCNC: 3 GM/DL
GLUCOSE SERPL-MCNC: 100 MG/DL (ref 65–99)
HCT VFR BLD AUTO: 38 % (ref 34–46.6)
HGB BLD-MCNC: 12.6 G/DL (ref 12–15.9)
IMM GRANULOCYTES # BLD AUTO: 0.02 10*3/MM3 (ref 0–0.05)
IMM GRANULOCYTES NFR BLD AUTO: 0.3 % (ref 0–0.5)
LYMPHOCYTES # BLD AUTO: 0.93 10*3/MM3 (ref 0.7–3.1)
LYMPHOCYTES NFR BLD AUTO: 14.7 % (ref 19.6–45.3)
MCH RBC QN AUTO: 31.5 PG (ref 26.6–33)
MCHC RBC AUTO-ENTMCNC: 33.2 G/DL (ref 31.5–35.7)
MCV RBC AUTO: 95 FL (ref 79–97)
MONOCYTES # BLD AUTO: 0.52 10*3/MM3 (ref 0.1–0.9)
MONOCYTES NFR BLD AUTO: 8.2 % (ref 5–12)
NEUTROPHILS NFR BLD AUTO: 4.78 10*3/MM3 (ref 1.7–7)
NEUTROPHILS NFR BLD AUTO: 75.6 % (ref 42.7–76)
NRBC BLD AUTO-RTO: 0 /100 WBC (ref 0–0.2)
PLATELET # BLD AUTO: 187 10*3/MM3 (ref 140–450)
PMV BLD AUTO: 10.5 FL (ref 6–12)
POTASSIUM SERPL-SCNC: 3.6 MMOL/L (ref 3.5–5.2)
PROT SERPL-MCNC: 7 G/DL (ref 6–8.5)
RBC # BLD AUTO: 4 10*6/MM3 (ref 3.77–5.28)
SODIUM SERPL-SCNC: 133 MMOL/L (ref 136–145)
WBC NRBC COR # BLD AUTO: 6.33 10*3/MM3 (ref 3.4–10.8)

## 2025-04-09 PROCEDURE — 25010000002 DIPHENHYDRAMINE PER 50 MG: Performed by: EMERGENCY MEDICINE

## 2025-04-09 PROCEDURE — 25010000002 KETOROLAC TROMETHAMINE PER 15 MG: Performed by: EMERGENCY MEDICINE

## 2025-04-09 PROCEDURE — 96375 TX/PRO/DX INJ NEW DRUG ADDON: CPT

## 2025-04-09 PROCEDURE — 25010000002 ONDANSETRON PER 1 MG: Performed by: EMERGENCY MEDICINE

## 2025-04-09 PROCEDURE — 25810000003 SODIUM CHLORIDE 0.9 % SOLUTION: Performed by: EMERGENCY MEDICINE

## 2025-04-09 PROCEDURE — 80053 COMPREHEN METABOLIC PANEL: CPT | Performed by: EMERGENCY MEDICINE

## 2025-04-09 PROCEDURE — 96374 THER/PROPH/DIAG INJ IV PUSH: CPT

## 2025-04-09 PROCEDURE — 85025 COMPLETE CBC W/AUTO DIFF WBC: CPT | Performed by: EMERGENCY MEDICINE

## 2025-04-09 PROCEDURE — 25010000002 PROCHLORPERAZINE 10 MG/2ML SOLUTION: Performed by: EMERGENCY MEDICINE

## 2025-04-09 RX ORDER — DIPHENHYDRAMINE HYDROCHLORIDE 50 MG/ML
25 INJECTION, SOLUTION INTRAMUSCULAR; INTRAVENOUS ONCE
Status: COMPLETED | OUTPATIENT
Start: 2025-04-09 | End: 2025-04-09

## 2025-04-09 RX ORDER — PROCHLORPERAZINE EDISYLATE 5 MG/ML
10 INJECTION INTRAMUSCULAR; INTRAVENOUS ONCE
Status: COMPLETED | OUTPATIENT
Start: 2025-04-09 | End: 2025-04-09

## 2025-04-09 RX ORDER — KETOROLAC TROMETHAMINE 15 MG/ML
15 INJECTION, SOLUTION INTRAMUSCULAR; INTRAVENOUS EVERY 6 HOURS PRN
Status: DISCONTINUED | OUTPATIENT
Start: 2025-04-09 | End: 2025-04-09 | Stop reason: HOSPADM

## 2025-04-09 RX ORDER — BENZONATATE 200 MG/1
200 CAPSULE ORAL 3 TIMES DAILY PRN
Qty: 21 CAPSULE | Refills: 0 | Status: SHIPPED | OUTPATIENT
Start: 2025-04-09

## 2025-04-09 RX ORDER — ONDANSETRON 4 MG/1
4 TABLET, ORALLY DISINTEGRATING ORAL EVERY 8 HOURS PRN
Qty: 9 TABLET | Refills: 0 | Status: SHIPPED | OUTPATIENT
Start: 2025-04-09

## 2025-04-09 RX ORDER — NAPROXEN 500 MG/1
500 TABLET ORAL 2 TIMES DAILY PRN
Qty: 20 TABLET | Refills: 0 | Status: SHIPPED | OUTPATIENT
Start: 2025-04-09

## 2025-04-09 RX ADMIN — KETOROLAC TROMETHAMINE 15 MG: 15 INJECTION, SOLUTION INTRAMUSCULAR; INTRAVENOUS at 01:37

## 2025-04-09 RX ADMIN — SODIUM CHLORIDE 1000 ML: 0.9 INJECTION, SOLUTION INTRAVENOUS at 00:48

## 2025-04-09 RX ADMIN — ONDANSETRON 4 MG: 2 INJECTION, SOLUTION INTRAMUSCULAR; INTRAVENOUS at 00:49

## 2025-04-09 RX ADMIN — DIPHENHYDRAMINE HYDROCHLORIDE 25 MG: 50 INJECTION, SOLUTION INTRAMUSCULAR; INTRAVENOUS at 02:29

## 2025-04-09 RX ADMIN — PROCHLORPERAZINE EDISYLATE 10 MG: 5 INJECTION, SOLUTION INTRAMUSCULAR; INTRAVENOUS at 01:59

## 2025-04-09 NOTE — ED PROVIDER NOTES
EMERGENCY DEPARTMENT ENCOUNTER    Room Number:  09/09  PCP: Harsha Armendariz MD  Patient Care Team:  Harsha Armendariz MD as PCP - General (Family Medicine)   Independent Historians: Patient    HPI:  Chief Complaint: Congestion, body aches, nausea    A complete HPI/ROS/PMH/PSH/SH/FH are unobtainable due to: None    Chronic or social conditions impacting patient care (Social Determinants of Health): None  (Financial Resource Strain / Food Insecurity / Transportation Needs / Physical Activity / Stress / Social Connections / Intimate Partner Violence / Housing Stability)    Context: Oliva Flores is a 45 y.o. female who presents to the ED c/o acute congestion with bodyaches and nausea.  Patient states he feels like she has the flu.  No significant abdominal pain no chest pain no shortness of breath.  Has been present since yesterday.    Review of prior external notes (non-ED) -and- Review of prior external test results outside of this encounter: Patient's primary care note from 12/17/2020 for    Prescription drug monitoring program review:         PAST MEDICAL HISTORY  Active Ambulatory Problems     Diagnosis Date Noted    Breast mass, right 03/17/2021    Blurred vision 04/26/2023    Aneurysm of internal carotid artery 04/27/2023    Dizziness 05/12/2023    Ataxia, unspecified 07/11/2023     Resolved Ambulatory Problems     Diagnosis Date Noted    No Resolved Ambulatory Problems     Past Medical History:   Diagnosis Date    Abnormal Pap smear of cervix     Aneurysm          PAST SURGICAL HISTORY  Past Surgical History:   Procedure Laterality Date    CERVICAL BIOPSY  W/ LOOP ELECTRODE EXCISION           FAMILY HISTORY  No family history on file.      SOCIAL HISTORY  Social History     Socioeconomic History    Marital status:    Tobacco Use    Smoking status: Every Day     Current packs/day: 0.25     Types: Cigarettes   Vaping Use    Vaping status: Every Day   Substance and Sexual Activity    Alcohol use: Yes      Comment: ocassional    Drug use: No    Sexual activity: Yes         ALLERGIES  Promethazine, Escitalopram oxalate, Oxycodone, and Phenergan [promethazine hcl]        REVIEW OF SYSTEMS  Review of Systems  Included in HPI  All systems reviewed and negative except for those discussed in HPI.      PHYSICAL EXAM    I have reviewed the triage vital signs and nursing notes.    ED Triage Vitals   Temp Heart Rate Resp BP SpO2   04/08/25 2151 04/08/25 2151 04/08/25 2151 04/08/25 2153 04/08/25 2151   99.3 °F (37.4 °C) 85 18 109/62 98 %      Temp src Heart Rate Source Patient Position BP Location FiO2 (%)   04/08/25 2151 04/08/25 2151 04/08/25 2153 04/08/25 2153 --   Tympanic Monitor Sitting Right arm        Physical Exam  GENERAL: alert, mild acute distress  SKIN: Warm, dry  HENT: Normocephalic, atraumatic  EYES: no scleral icterus  CV: regular rhythm, regular rate  RESPIRATORY: normal effort, lungs clear  ABDOMEN: soft, nontender, nondistended  MUSCULOSKELETAL: no deformity  NEURO: alert, moves all extremities, follows commands                                                               LAB RESULTS  Recent Results (from the past 24 hours)   Respiratory Panel PCR w/COVID-19(SARS-CoV-2) [facility] In-House, NP Swab in UTM/VTM, 2 HR TAT - Swab, Nasopharynx    Collection Time: 04/08/25  9:55 PM    Specimen: Nasopharynx; Swab   Result Value Ref Range    ADENOVIRUS, PCR Not Detected Not Detected    Coronavirus 229E Not Detected Not Detected    Coronavirus HKU1 Not Detected Not Detected    Coronavirus NL63 Not Detected Not Detected    Coronavirus OC43 Not Detected Not Detected    COVID19 Not Detected Not Detected - Ref. Range    Human Metapneumovirus Detected (A) Not Detected    Human Rhinovirus/Enterovirus Not Detected Not Detected    Influenza A PCR Not Detected Not Detected    Influenza B PCR Not Detected Not Detected    Parainfluenza Virus 1 Not Detected Not Detected    Parainfluenza Virus 2 Not Detected Not  Detected    Parainfluenza Virus 3 Not Detected Not Detected    Parainfluenza Virus 4 Not Detected Not Detected    RSV, PCR Not Detected Not Detected    Bordetella pertussis pcr Not Detected Not Detected    Bordetella parapertussis PCR Not Detected Not Detected    Chlamydophila pneumoniae PCR Not Detected Not Detected    Mycoplasma pneumo by PCR Not Detected Not Detected   Comprehensive Metabolic Panel    Collection Time: 04/09/25 12:52 AM    Specimen: Arm, Right; Blood   Result Value Ref Range    Glucose 100 (H) 65 - 99 mg/dL    BUN 7 6 - 20 mg/dL    Creatinine 0.92 0.57 - 1.00 mg/dL    Sodium 133 (L) 136 - 145 mmol/L    Potassium 3.6 3.5 - 5.2 mmol/L    Chloride 105 98 - 107 mmol/L    CO2 20.0 (L) 22.0 - 29.0 mmol/L    Calcium 9.1 8.6 - 10.5 mg/dL    Total Protein 7.0 6.0 - 8.5 g/dL    Albumin 4.0 3.5 - 5.2 g/dL    ALT (SGPT) 39 (H) 1 - 33 U/L    AST (SGOT) 48 (H) 1 - 32 U/L    Alkaline Phosphatase 47 39 - 117 U/L    Total Bilirubin 0.3 0.0 - 1.2 mg/dL    Globulin 3.0 gm/dL    A/G Ratio 1.3 g/dL    BUN/Creatinine Ratio 7.6 7.0 - 25.0    Anion Gap 8.0 5.0 - 15.0 mmol/L    eGFR 78.9 >60.0 mL/min/1.73   CBC Auto Differential    Collection Time: 04/09/25 12:52 AM    Specimen: Arm, Right; Blood   Result Value Ref Range    WBC 6.33 3.40 - 10.80 10*3/mm3    RBC 4.00 3.77 - 5.28 10*6/mm3    Hemoglobin 12.6 12.0 - 15.9 g/dL    Hematocrit 38.0 34.0 - 46.6 %    MCV 95.0 79.0 - 97.0 fL    MCH 31.5 26.6 - 33.0 pg    MCHC 33.2 31.5 - 35.7 g/dL    RDW 13.0 12.3 - 15.4 %    RDW-SD 44.8 37.0 - 54.0 fl    MPV 10.5 6.0 - 12.0 fL    Platelets 187 140 - 450 10*3/mm3    Neutrophil % 75.6 42.7 - 76.0 %    Lymphocyte % 14.7 (L) 19.6 - 45.3 %    Monocyte % 8.2 5.0 - 12.0 %    Eosinophil % 0.3 0.3 - 6.2 %    Basophil % 0.9 0.0 - 1.5 %    Immature Grans % 0.3 0.0 - 0.5 %    Neutrophils, Absolute 4.78 1.70 - 7.00 10*3/mm3    Lymphocytes, Absolute 0.93 0.70 - 3.10 10*3/mm3    Monocytes, Absolute 0.52 0.10 - 0.90 10*3/mm3    Eosinophils,  Absolute 0.02 0.00 - 0.40 10*3/mm3    Basophils, Absolute 0.06 0.00 - 0.20 10*3/mm3    Immature Grans, Absolute 0.02 0.00 - 0.05 10*3/mm3    nRBC 0.0 0.0 - 0.2 /100 WBC       I ordered the above labs and independently reviewed the results.        RADIOLOGY  XR Chest 2 View  Result Date: 4/8/2025  TWO VIEWS OF THE CHEST   HISTORY: cough  COMPARISON: Chest x-ray 5/12/2023  TECHNIQUE: PA and lateral views were performed of the chest.  FINDINGS:  Heart size is normal.  The lungs are normally aerated. There is no infiltrate, effusion, pneumothorax or suspicious nodule.  There is no acute bony abnormality.       Negative chest, no acute abnormality.  This report was finalized on 4/8/2025 11:51 PM by Dr. Dany See M.D on Workstation: [id number]        I ordered the above noted radiological studies. Reviewed by me and discussed with radiologist.  See dictation for official radiology interpretation.      PROCEDURES    Procedures      MEDICATIONS GIVEN IN ER    Medications   sodium chloride 0.9 % bolus 1,000 mL (0 mL Intravenous Stopped 4/9/25 0338)   ondansetron (ZOFRAN) injection 4 mg (4 mg Intravenous Given 4/9/25 0049)   prochlorperazine (COMPAZINE) injection 10 mg (10 mg Intravenous Given 4/9/25 0159)   diphenhydrAMINE (BENADRYL) injection 25 mg (25 mg Intravenous Given 4/9/25 0229)         ORDERS PLACED DURING THIS VISIT:  Orders Placed This Encounter   Procedures    Respiratory Panel PCR w/COVID-19(SARS-CoV-2) [facility] In-House, NP Swab in UTM/VTM, 2 HR TAT - Swab, Nasopharynx    XR Chest 2 View    Comprehensive Metabolic Panel    CBC Auto Differential    CBC & Differential         PROGRESS, DATA ANALYSIS, CONSULTS, AND MEDICAL DECISION MAKING    All labs have been independently interpreted by me.  All radiology studies have been reviewed by me and discussed with radiologist dictating the report.   EKG's independently viewed and interpreted by me.  Discussion below represents my analysis of  pertinent findings related to patient's condition, differential diagnosis, treatment plan and final disposition.    Differential diagnosis includes but is not limited to, pneumonia, influenza, COVID-19    Clinical Scores:              ED Course as of 04/09/25 0710   Tue Apr 08, 2025   2355 Human Metapneumovirus(!): Detected [TJ]   2355 I have independently reviewed patient's chest x-ray; my interpretation is no infiltrate no pneumothorax [TJ]   Wed Apr 09, 2025   0128 WBC: 6.33 [TJ]   0128 Hemoglobin: 12.6 [TJ]   0128 Platelets: 187 [TJ]   0129 Creatinine: 0.92 [TJ]   0129 Anion Gap: 8.0 [TJ]   0335 Last week, patient states he is feeling better.  Will discharge home with symptomatic treatment. [TJ]      ED Course User Index  [TJ] Dany Feritas MD               PPE: The patient wore a mask and I wore an N95 mask throughout the entire patient encounter.       AS OF 07:10 EDT VITALS:    BP - 99/59  HR - 67  TEMP - 98.4 °F (36.9 °C) (Oral)  O2 SATS - 98%        DIAGNOSIS  Final diagnoses:   Viral illness   Human metapneumovirus as the cause of diseases classified elsewhere         DISPOSITION  ED Disposition       ED Disposition   Discharge    Condition   Stable    Comment   --                  Note Disclaimer: At [facility], we believe that sharing information builds trust and better relationships. You are receiving this note because you recently visited [facility]. It is possible you will see health information before a provider has talked with you about it. This kind of information can be easy to misunderstand. To help you fully understand what it means for your health, we urge you to discuss this note with your provider.         Dany Freitas MD  04/09/25 0756